# Patient Record
Sex: FEMALE | Race: WHITE | Employment: FULL TIME | URBAN - NONMETROPOLITAN AREA
[De-identification: names, ages, dates, MRNs, and addresses within clinical notes are randomized per-mention and may not be internally consistent; named-entity substitution may affect disease eponyms.]

---

## 2017-10-04 ENCOUNTER — APPOINTMENT (OUTPATIENT)
Dept: LABOR AND DELIVERY | Age: 27
End: 2017-10-04
Payer: COMMERCIAL

## 2017-10-04 ENCOUNTER — HOSPITAL ENCOUNTER (INPATIENT)
Age: 27
LOS: 2 days | Discharge: HOME | End: 2017-10-08
Attending: OBSTETRICS & GYNECOLOGY | Admitting: OBSTETRICS & GYNECOLOGY
Payer: COMMERCIAL

## 2017-10-04 LAB
ABO: NORMAL
ANISOCYTOSIS: ABNORMAL
ANTIBODY SCREEN: NORMAL
BASOPHILS # BLD: 0.2 %
BASOPHILS ABSOLUTE: 0 THOU/MM3 (ref 0–0.1)
EOSINOPHIL # BLD: 0.8 %
EOSINOPHILS ABSOLUTE: 0.1 THOU/MM3 (ref 0–0.4)
HCT VFR BLD CALC: 34.8 % (ref 37–47)
HEMOGLOBIN: 11.8 GM/DL (ref 12–16)
LYMPHOCYTES # BLD: 20.5 %
LYMPHOCYTES ABSOLUTE: 2 THOU/MM3 (ref 1–4.8)
MCH RBC QN AUTO: 30.3 PG (ref 27–31)
MCHC RBC AUTO-ENTMCNC: 33.9 GM/DL (ref 33–37)
MCV RBC AUTO: 89.2 FL (ref 81–99)
MONOCYTES # BLD: 5.3 %
MONOCYTES ABSOLUTE: 0.5 THOU/MM3 (ref 0.4–1.3)
NUCLEATED RED BLOOD CELLS: 0 /100 WBC
PDW BLD-RTO: 15.8 % (ref 11.5–14.5)
PLATELET # BLD: 175 THOU/MM3 (ref 130–400)
PMV BLD AUTO: 8.2 MCM (ref 7.4–10.4)
RBC # BLD: 3.89 MILL/MM3 (ref 4.2–5.4)
RBC # BLD: NORMAL 10*6/UL
RH FACTOR: NORMAL
SEG NEUTROPHILS: 73.2 %
SEGMENTED NEUTROPHILS ABSOLUTE COUNT: 7.2 THOU/MM3 (ref 1.8–7.7)
WBC # BLD: 9.9 THOU/MM3 (ref 4.8–10.8)

## 2017-10-04 PROCEDURE — 1220000001 HC SEMI PRIVATE L&D R&B

## 2017-10-04 PROCEDURE — 86592 SYPHILIS TEST NON-TREP QUAL: CPT

## 2017-10-04 PROCEDURE — 6370000000 HC RX 637 (ALT 250 FOR IP): Performed by: OBSTETRICS & GYNECOLOGY

## 2017-10-04 PROCEDURE — 86900 BLOOD TYPING SEROLOGIC ABO: CPT

## 2017-10-04 PROCEDURE — A6258 TRANSPARENT FILM >16<=48 IN: HCPCS

## 2017-10-04 PROCEDURE — 86901 BLOOD TYPING SEROLOGIC RH(D): CPT

## 2017-10-04 PROCEDURE — 86850 RBC ANTIBODY SCREEN: CPT

## 2017-10-04 PROCEDURE — 36415 COLL VENOUS BLD VENIPUNCTURE: CPT

## 2017-10-04 PROCEDURE — 6360000002 HC RX W HCPCS

## 2017-10-04 PROCEDURE — 85025 COMPLETE CBC W/AUTO DIFF WBC: CPT

## 2017-10-04 PROCEDURE — C1758 CATHETER, URETERAL: HCPCS

## 2017-10-04 PROCEDURE — 2580000003 HC RX 258: Performed by: OBSTETRICS & GYNECOLOGY

## 2017-10-04 RX ORDER — IBUPROFEN 800 MG/1
800 TABLET ORAL EVERY 8 HOURS PRN
Status: DISCONTINUED | OUTPATIENT
Start: 2017-10-04 | End: 2017-10-06

## 2017-10-04 RX ORDER — CARBOPROST TROMETHAMINE 250 UG/ML
250 INJECTION, SOLUTION INTRAMUSCULAR PRN
Status: CANCELLED | OUTPATIENT
Start: 2017-10-04

## 2017-10-04 RX ORDER — BUTORPHANOL TARTRATE 1 MG/ML
1 INJECTION, SOLUTION INTRAMUSCULAR; INTRAVENOUS
Status: DISCONTINUED | OUTPATIENT
Start: 2017-10-04 | End: 2017-10-06 | Stop reason: HOSPADM

## 2017-10-04 RX ORDER — METHYLERGONOVINE MALEATE 0.2 MG/ML
200 INJECTION INTRAVENOUS PRN
Status: DISCONTINUED | OUTPATIENT
Start: 2017-10-04 | End: 2017-10-06

## 2017-10-04 RX ORDER — MORPHINE SULFATE 2 MG/ML
4 INJECTION, SOLUTION INTRAMUSCULAR; INTRAVENOUS
Status: DISCONTINUED | OUTPATIENT
Start: 2017-10-04 | End: 2017-10-06

## 2017-10-04 RX ORDER — ONDANSETRON 2 MG/ML
8 INJECTION INTRAMUSCULAR; INTRAVENOUS EVERY 6 HOURS PRN
Status: DISCONTINUED | OUTPATIENT
Start: 2017-10-04 | End: 2017-10-06

## 2017-10-04 RX ORDER — LIDOCAINE HYDROCHLORIDE 10 MG/ML
30 INJECTION, SOLUTION EPIDURAL; INFILTRATION; INTRACAUDAL; PERINEURAL PRN
Status: DISCONTINUED | OUTPATIENT
Start: 2017-10-04 | End: 2017-10-06

## 2017-10-04 RX ORDER — SODIUM CHLORIDE 0.9 % (FLUSH) 0.9 %
10 SYRINGE (ML) INJECTION EVERY 12 HOURS SCHEDULED
Status: DISCONTINUED | OUTPATIENT
Start: 2017-10-04 | End: 2017-10-06 | Stop reason: HOSPADM

## 2017-10-04 RX ORDER — ACETAMINOPHEN 325 MG/1
650 TABLET ORAL EVERY 4 HOURS PRN
Status: DISCONTINUED | OUTPATIENT
Start: 2017-10-04 | End: 2017-10-06 | Stop reason: HOSPADM

## 2017-10-04 RX ORDER — SODIUM CHLORIDE 0.9 % (FLUSH) 0.9 %
10 SYRINGE (ML) INJECTION PRN
Status: DISCONTINUED | OUTPATIENT
Start: 2017-10-04 | End: 2017-10-06 | Stop reason: HOSPADM

## 2017-10-04 RX ORDER — SODIUM CHLORIDE, SODIUM LACTATE, POTASSIUM CHLORIDE, CALCIUM CHLORIDE 600; 310; 30; 20 MG/100ML; MG/100ML; MG/100ML; MG/100ML
INJECTION, SOLUTION INTRAVENOUS CONTINUOUS
Status: DISCONTINUED | OUTPATIENT
Start: 2017-10-04 | End: 2017-10-06

## 2017-10-04 RX ORDER — DIPHENHYDRAMINE HYDROCHLORIDE 50 MG/ML
25 INJECTION INTRAMUSCULAR; INTRAVENOUS EVERY 4 HOURS PRN
Status: DISCONTINUED | OUTPATIENT
Start: 2017-10-04 | End: 2017-10-06 | Stop reason: HOSPADM

## 2017-10-04 RX ORDER — MORPHINE SULFATE 2 MG/ML
2 INJECTION, SOLUTION INTRAMUSCULAR; INTRAVENOUS
Status: DISCONTINUED | OUTPATIENT
Start: 2017-10-04 | End: 2017-10-06

## 2017-10-04 RX ORDER — TERBUTALINE SULFATE 1 MG/ML
0.25 INJECTION, SOLUTION SUBCUTANEOUS ONCE
Status: DISCONTINUED | OUTPATIENT
Start: 2017-10-04 | End: 2017-10-06 | Stop reason: HOSPADM

## 2017-10-04 RX ADMIN — SODIUM CHLORIDE, POTASSIUM CHLORIDE, SODIUM LACTATE AND CALCIUM CHLORIDE: 600; 310; 30; 20 INJECTION, SOLUTION INTRAVENOUS at 20:40

## 2017-10-04 RX ADMIN — Medication 25 MCG: at 23:37

## 2017-10-05 ENCOUNTER — ANESTHESIA EVENT (OUTPATIENT)
Dept: LABOR AND DELIVERY | Age: 27
End: 2017-10-05
Payer: COMMERCIAL

## 2017-10-05 ENCOUNTER — ANESTHESIA (OUTPATIENT)
Dept: LABOR AND DELIVERY | Age: 27
End: 2017-10-05
Payer: COMMERCIAL

## 2017-10-05 LAB — RPR: NONREACTIVE

## 2017-10-05 PROCEDURE — 2580000003 HC RX 258: Performed by: OBSTETRICS & GYNECOLOGY

## 2017-10-05 PROCEDURE — 3700000025 ANESTHESIA EPIDURAL BLOCK: Performed by: ANESTHESIOLOGY

## 2017-10-05 PROCEDURE — 6370000000 HC RX 637 (ALT 250 FOR IP): Performed by: OBSTETRICS & GYNECOLOGY

## 2017-10-05 PROCEDURE — 6360000002 HC RX W HCPCS: Performed by: OBSTETRICS & GYNECOLOGY

## 2017-10-05 PROCEDURE — 1220000001 HC SEMI PRIVATE L&D R&B

## 2017-10-05 PROCEDURE — A6258 TRANSPARENT FILM >16<=48 IN: HCPCS

## 2017-10-05 PROCEDURE — 6360000002 HC RX W HCPCS: Performed by: NURSE ANESTHETIST, CERTIFIED REGISTERED

## 2017-10-05 PROCEDURE — 2500000003 HC RX 250 WO HCPCS: Performed by: ANESTHESIOLOGY

## 2017-10-05 RX ORDER — ROPIVACAINE HYDROCHLORIDE 2 MG/ML
INJECTION, SOLUTION EPIDURAL; INFILTRATION; PERINEURAL PRN
Status: DISCONTINUED | OUTPATIENT
Start: 2017-10-05 | End: 2017-10-06 | Stop reason: SDUPTHER

## 2017-10-05 RX ORDER — NALOXONE HYDROCHLORIDE 0.4 MG/ML
0.4 INJECTION, SOLUTION INTRAMUSCULAR; INTRAVENOUS; SUBCUTANEOUS PRN
Status: DISCONTINUED | OUTPATIENT
Start: 2017-10-05 | End: 2017-10-06 | Stop reason: HOSPADM

## 2017-10-05 RX ORDER — ROPIVACAINE HYDROCHLORIDE 5 MG/ML
INJECTION, SOLUTION EPIDURAL; INFILTRATION; PERINEURAL
Status: DISCONTINUED
Start: 2017-10-05 | End: 2017-10-05 | Stop reason: WASHOUT

## 2017-10-05 RX ORDER — ONDANSETRON 2 MG/ML
4 INJECTION INTRAMUSCULAR; INTRAVENOUS EVERY 6 HOURS PRN
Status: DISCONTINUED | OUTPATIENT
Start: 2017-10-05 | End: 2017-10-06

## 2017-10-05 RX ORDER — ROPIVACAINE HYDROCHLORIDE 2 MG/ML
INJECTION, SOLUTION EPIDURAL; INFILTRATION; PERINEURAL
Status: DISCONTINUED
Start: 2017-10-05 | End: 2017-10-06

## 2017-10-05 RX ADMIN — SODIUM CHLORIDE, POTASSIUM CHLORIDE, SODIUM LACTATE AND CALCIUM CHLORIDE: 600; 310; 30; 20 INJECTION, SOLUTION INTRAVENOUS at 03:37

## 2017-10-05 RX ADMIN — Medication 25 MCG: at 06:10

## 2017-10-05 RX ADMIN — BUTORPHANOL TARTRATE 1 MG: 1 INJECTION, SOLUTION INTRAMUSCULAR; INTRAVENOUS at 11:11

## 2017-10-05 RX ADMIN — Medication 15 ML: at 14:16

## 2017-10-05 RX ADMIN — ROPIVACAINE HYDROCHLORIDE 16 MG: 2 INJECTION, SOLUTION EPIDURAL; INFILTRATION at 14:15

## 2017-10-05 RX ADMIN — Medication 1 MILLI-UNITS/MIN: at 10:19

## 2017-10-05 RX ADMIN — SODIUM CHLORIDE, POTASSIUM CHLORIDE, SODIUM LACTATE AND CALCIUM CHLORIDE: 600; 310; 30; 20 INJECTION, SOLUTION INTRAVENOUS at 11:12

## 2017-10-05 RX ADMIN — ACETAMINOPHEN 650 MG: 325 TABLET ORAL at 09:29

## 2017-10-05 RX ADMIN — SODIUM CHLORIDE, POTASSIUM CHLORIDE, SODIUM LACTATE AND CALCIUM CHLORIDE: 600; 310; 30; 20 INJECTION, SOLUTION INTRAVENOUS at 14:33

## 2017-10-05 ASSESSMENT — PAIN SCALES - GENERAL
PAINLEVEL_OUTOF10: 5
PAINLEVEL_OUTOF10: 3

## 2017-10-05 NOTE — FLOWSHEET NOTE
Dr Lamar Hernandez at bedside, fetal monitor strip viewed. Discussing POC with patient and spouse. All questions and concerns addressed. Will continue with POC at this time.

## 2017-10-05 NOTE — ANESTHESIA PROCEDURE NOTES
Epidural Block    Patient location during procedure: OB  Start time: 10/5/2017 1:56 PM  End time: 10/5/2017 2:15 PM  Reason for block: labor epidural  Staffing  Anesthesiologist: Patricia Rodriguez  Resident/CRNA: Quentin Kimble  Performed by: resident/CRNA   Preanesthetic Checklist  Completed: patient identified, site marked, surgical consent, pre-op evaluation, timeout performed, IV checked, risks and benefits discussed, monitors and equipment checked, anesthesia consent given, oxygen available and patient being monitored  Epidural  Patient position: sitting  Prep: ChloraPrep and site prepped and draped  Patient monitoring: continuous pulse ox and frequent blood pressure checks  Approach: midline  Location: lumbar (1-5)  Injection technique: ISABELLA saline  Provider prep: mask and sterile gloves  Needle  Needle type: Tuohy   Needle gauge: 18 G  Needle length: 3.5 in  Needle insertion depth: 5.5 cm  Catheter type: stylet  Catheter size: 20g.   Catheter at skin depth: 12 cm  Test dose: negative  Kit: perifix  Lot number: [id number]  Expiration date: 11/30/2018  Assessment  Hemodynamics: stable

## 2017-10-05 NOTE — H&P
Sola Hairston is a 24 y.o. female patient. No diagnosis found. Past Medical History:   Diagnosis Date    Anxiety disorder     Depression      OB History      Para Term  AB Living    1         SAB TAB Ectopic Molar Multiple Live Births                 41w0d  Estimated Date of Delivery: 17  No Known Allergies  Active Problems:    * No active hospital problems. *    Blood pressure 101/62, pulse 83, temperature 98.1 °F (36.7 °C), resp. rate 18, height 5' 2\" (1.575 m), weight 181 lb (82.1 kg), SpO2 98 %. Maternal Medical History:   Reason for admission: 41 weeks for IOL    Contractions: Onset was 6-12 hours ago. Frequency: irregular. Perceived severity is moderate. Fetal activity: Perceived fetal activity is normal.    Last perceived fetal movement was within the past hour. Prenatal complications: no prenatal complications  Prenatal Complications - Diabetes: none. Maternal Exam:   Uterine Assessment: Contraction strength is moderate. Contraction frequency is regular. Abdomen: Patient reports no abdominal tenderness. Fetal presentation: vertex    Introitus: Normal vulva. Normal vagina. Pelvis: questionable for delivery. Cervix: Cervix evaluated by digital exam.    50/-2 AROM meconium FSE placed    Fetal Exam  Fetal Monitor Review: Mode: fetal scalp electrode. Baseline rate: 120. Variability: moderate (6-25 bpm). Fetal State Assessment: Category I - tracings are normal.          Assessment:  Early latent labor. Membrane status: SROM. Fetal well-being: normal.   41 weeks gestation    Plan:  Admit to L&D  Epidural in place  Vaginal delivery if good progression of labor.  section if arrest of dilation.       Miladys Romero,   10/5/2017

## 2017-10-05 NOTE — FLOWSHEET NOTE
Dr Ciro Kim paged; returned page. Update: subtle late decels with just about every ctx; on side where ctx are not picking up. Orders for an IUPC and amnioinfusion. To continue with plan of care.

## 2017-10-05 NOTE — FLOWSHEET NOTE
Called Dr Alexa Worthy, updated FHTs reactive, contractions every 3-6 minutes, mild. Patient been sleeping through them. Discussed VE fingertip/ thick and soft. First cytotec given at 2337 and unable to give second dose due to contraction pattern per policy. Orders received.

## 2017-10-05 NOTE — FLOWSHEET NOTE
Dr Nicci Barth paged; returned page. Given update on pt status, ie, aimee q 2 but mild, vag exam, 1 and tight, 60, -2, vertex, intact. Orders rec'd to start pitocin and continue with plan of care.

## 2017-10-05 NOTE — FLOWSHEET NOTE
Dr. Malcolm Martínez updated on SVE 3/80/-2, IUPC fell out, patient currently in hands and knees. Also made aware that RN will replace IUPC when patient out of hands and knees. States to continue with current plan of care.

## 2017-10-05 NOTE — FLOWSHEET NOTE
Pt resting with eyes closed. Report given to sara fink rn. She wanted to have pt to get into hands and knees; pt burst into tears and states she just wants to wait for dr Chavez Fails. Reassurances given as far as baby is concerned. Told her when dr Chavez Fails is done in the office she will be over.

## 2017-10-05 NOTE — FLOWSHEET NOTE
Called Dr Malcolm Martínez, updated FHTs reactive. Contraction pattern discussed. Second dose of cytotec given at 0610. VE unchanged, intact. Negative GBS. Will continue with POC.

## 2017-10-05 NOTE — FLOWSHEET NOTE
Pt is awake and alert. Discussed pain; contractions 2-3 but also had a headache \"from not having any caffeine this morning\"; that pain is a good 3 per pt. Up to b/r to void and do personal hygiene. Will call out when finished.

## 2017-10-06 VITALS — TEMPERATURE: 98.6 F | DIASTOLIC BLOOD PRESSURE: 82 MMHG | OXYGEN SATURATION: 99 % | SYSTOLIC BLOOD PRESSURE: 148 MMHG

## 2017-10-06 PROCEDURE — 6370000000 HC RX 637 (ALT 250 FOR IP): Performed by: OBSTETRICS & GYNECOLOGY

## 2017-10-06 PROCEDURE — 2580000003 HC RX 258: Performed by: OBSTETRICS & GYNECOLOGY

## 2017-10-06 PROCEDURE — 2500000003 HC RX 250 WO HCPCS: Performed by: OBSTETRICS & GYNECOLOGY

## 2017-10-06 PROCEDURE — 3700000000 HC ANESTHESIA ATTENDED CARE: Performed by: OBSTETRICS & GYNECOLOGY

## 2017-10-06 PROCEDURE — 2580000003 HC RX 258: Performed by: ANESTHESIOLOGY

## 2017-10-06 PROCEDURE — 6360000002 HC RX W HCPCS: Performed by: ANESTHESIOLOGY

## 2017-10-06 PROCEDURE — 3700000001 HC ADD 15 MINUTES (ANESTHESIA): Performed by: OBSTETRICS & GYNECOLOGY

## 2017-10-06 PROCEDURE — 7100000001 HC PACU RECOVERY - ADDTL 15 MIN: Performed by: OBSTETRICS & GYNECOLOGY

## 2017-10-06 PROCEDURE — 6360000002 HC RX W HCPCS: Performed by: OBSTETRICS & GYNECOLOGY

## 2017-10-06 PROCEDURE — 3609079900 HC CESAREAN SECTION: Performed by: OBSTETRICS & GYNECOLOGY

## 2017-10-06 PROCEDURE — 10907ZC DRAINAGE OF AMNIOTIC FLUID, THERAPEUTIC FROM PRODUCTS OF CONCEPTION, VIA NATURAL OR ARTIFICIAL OPENING: ICD-10-PCS | Performed by: OBSTETRICS & GYNECOLOGY

## 2017-10-06 PROCEDURE — 1220000000 HC SEMI PRIVATE OB R&B

## 2017-10-06 PROCEDURE — 6360000002 HC RX W HCPCS

## 2017-10-06 PROCEDURE — 2500000003 HC RX 250 WO HCPCS

## 2017-10-06 PROCEDURE — 2500000003 HC RX 250 WO HCPCS: Performed by: ANESTHESIOLOGY

## 2017-10-06 PROCEDURE — 7100000000 HC PACU RECOVERY - FIRST 15 MIN: Performed by: OBSTETRICS & GYNECOLOGY

## 2017-10-06 PROCEDURE — S0028 INJECTION, FAMOTIDINE, 20 MG: HCPCS | Performed by: OBSTETRICS & GYNECOLOGY

## 2017-10-06 RX ORDER — KETOROLAC TROMETHAMINE 30 MG/ML
30 INJECTION, SOLUTION INTRAMUSCULAR; INTRAVENOUS EVERY 6 HOURS
Status: DISPENSED | OUTPATIENT
Start: 2017-10-06 | End: 2017-10-07

## 2017-10-06 RX ORDER — DOCUSATE SODIUM 100 MG/1
100 CAPSULE, LIQUID FILLED ORAL 2 TIMES DAILY
Status: DISCONTINUED | OUTPATIENT
Start: 2017-10-06 | End: 2017-10-08 | Stop reason: HOSPADM

## 2017-10-06 RX ORDER — ONDANSETRON 2 MG/ML
INJECTION INTRAMUSCULAR; INTRAVENOUS
Status: COMPLETED
Start: 2017-10-06 | End: 2017-10-06

## 2017-10-06 RX ORDER — MORPHINE SULFATE 2 MG/ML
2 INJECTION, SOLUTION INTRAMUSCULAR; INTRAVENOUS
Status: DISCONTINUED | OUTPATIENT
Start: 2017-10-06 | End: 2017-10-08 | Stop reason: HOSPADM

## 2017-10-06 RX ORDER — ONDANSETRON 2 MG/ML
4 INJECTION INTRAMUSCULAR; INTRAVENOUS EVERY 6 HOURS PRN
Status: DISCONTINUED | OUTPATIENT
Start: 2017-10-06 | End: 2017-10-06

## 2017-10-06 RX ORDER — LANOLIN 100 %
OINTMENT (GRAM) TOPICAL
Status: DISCONTINUED | OUTPATIENT
Start: 2017-10-06 | End: 2017-10-08 | Stop reason: HOSPADM

## 2017-10-06 RX ORDER — DIPHENHYDRAMINE HYDROCHLORIDE 50 MG/ML
25 INJECTION INTRAMUSCULAR; INTRAVENOUS EVERY 6 HOURS PRN
Status: DISCONTINUED | OUTPATIENT
Start: 2017-10-06 | End: 2017-10-08 | Stop reason: HOSPADM

## 2017-10-06 RX ORDER — SODIUM CHLORIDE, SODIUM LACTATE, POTASSIUM CHLORIDE, CALCIUM CHLORIDE 600; 310; 30; 20 MG/100ML; MG/100ML; MG/100ML; MG/100ML
INJECTION, SOLUTION INTRAVENOUS CONTINUOUS
Status: DISCONTINUED | OUTPATIENT
Start: 2017-10-06 | End: 2017-10-08 | Stop reason: HOSPADM

## 2017-10-06 RX ORDER — ACETAMINOPHEN 325 MG/1
650 TABLET ORAL EVERY 4 HOURS PRN
Status: DISCONTINUED | OUTPATIENT
Start: 2017-10-06 | End: 2017-10-08 | Stop reason: HOSPADM

## 2017-10-06 RX ORDER — METHYLERGONOVINE MALEATE 0.2 MG/ML
200 INJECTION INTRAVENOUS PRN
Status: DISCONTINUED | OUTPATIENT
Start: 2017-10-06 | End: 2017-10-08 | Stop reason: HOSPADM

## 2017-10-06 RX ORDER — KETOROLAC TROMETHAMINE 30 MG/ML
INJECTION, SOLUTION INTRAMUSCULAR; INTRAVENOUS
Status: COMPLETED
Start: 2017-10-06 | End: 2017-10-06

## 2017-10-06 RX ORDER — MORPHINE SULFATE 2 MG/ML
4 INJECTION, SOLUTION INTRAMUSCULAR; INTRAVENOUS
Status: DISCONTINUED | OUTPATIENT
Start: 2017-10-06 | End: 2017-10-08 | Stop reason: HOSPADM

## 2017-10-06 RX ORDER — PRENATAL WITH FERROUS FUM AND FOLIC ACID 3080; 920; 120; 400; 22; 1.84; 3; 20; 10; 1; 12; 200; 27; 25; 2 [IU]/1; [IU]/1; MG/1; [IU]/1; MG/1; MG/1; MG/1; MG/1; MG/1; MG/1; UG/1; MG/1; MG/1; MG/1; MG/1
1 TABLET ORAL DAILY
Status: DISCONTINUED | OUTPATIENT
Start: 2017-10-06 | End: 2017-10-08 | Stop reason: HOSPADM

## 2017-10-06 RX ORDER — LIDOCAINE HYDROCHLORIDE 20 MG/ML
INJECTION, SOLUTION EPIDURAL; INFILTRATION; INTRACAUDAL; PERINEURAL PRN
Status: DISCONTINUED | OUTPATIENT
Start: 2017-10-06 | End: 2017-10-06 | Stop reason: SDUPTHER

## 2017-10-06 RX ORDER — OXYCODONE HYDROCHLORIDE AND ACETAMINOPHEN 5; 325 MG/1; MG/1
1 TABLET ORAL EVERY 4 HOURS PRN
Status: DISCONTINUED | OUTPATIENT
Start: 2017-10-06 | End: 2017-10-08 | Stop reason: HOSPADM

## 2017-10-06 RX ORDER — ONDANSETRON 2 MG/ML
8 INJECTION INTRAMUSCULAR; INTRAVENOUS ONCE
Status: COMPLETED | OUTPATIENT
Start: 2017-10-06 | End: 2017-10-06

## 2017-10-06 RX ORDER — MISOPROSTOL 200 UG/1
800 TABLET ORAL PRN
Status: DISCONTINUED | OUTPATIENT
Start: 2017-10-06 | End: 2017-10-08 | Stop reason: HOSPADM

## 2017-10-06 RX ORDER — SIMETHICONE 80 MG
80 TABLET,CHEWABLE ORAL EVERY 6 HOURS PRN
Status: DISCONTINUED | OUTPATIENT
Start: 2017-10-06 | End: 2017-10-08 | Stop reason: HOSPADM

## 2017-10-06 RX ORDER — FERROUS SULFATE 325(65) MG
325 TABLET ORAL
Status: DISCONTINUED | OUTPATIENT
Start: 2017-10-06 | End: 2017-10-08 | Stop reason: HOSPADM

## 2017-10-06 RX ORDER — BISACODYL 10 MG
10 SUPPOSITORY, RECTAL RECTAL DAILY PRN
Status: DISCONTINUED | OUTPATIENT
Start: 2017-10-06 | End: 2017-10-08 | Stop reason: HOSPADM

## 2017-10-06 RX ORDER — SODIUM CHLORIDE 0.9 % (FLUSH) 0.9 %
10 SYRINGE (ML) INJECTION EVERY 12 HOURS SCHEDULED
Status: DISCONTINUED | OUTPATIENT
Start: 2017-10-06 | End: 2017-10-08 | Stop reason: HOSPADM

## 2017-10-06 RX ORDER — OXYCODONE HYDROCHLORIDE AND ACETAMINOPHEN 5; 325 MG/1; MG/1
2 TABLET ORAL EVERY 4 HOURS PRN
Status: DISCONTINUED | OUTPATIENT
Start: 2017-10-06 | End: 2017-10-08 | Stop reason: HOSPADM

## 2017-10-06 RX ORDER — SODIUM CHLORIDE 0.9 % (FLUSH) 0.9 %
10 SYRINGE (ML) INJECTION PRN
Status: DISCONTINUED | OUTPATIENT
Start: 2017-10-06 | End: 2017-10-08 | Stop reason: HOSPADM

## 2017-10-06 RX ORDER — ONDANSETRON 4 MG/1
8 TABLET, FILM COATED ORAL EVERY 8 HOURS PRN
Status: DISCONTINUED | OUTPATIENT
Start: 2017-10-06 | End: 2017-10-08 | Stop reason: HOSPADM

## 2017-10-06 RX ORDER — SODIUM CHLORIDE, SODIUM LACTATE, POTASSIUM CHLORIDE, CALCIUM CHLORIDE 600; 310; 30; 20 MG/100ML; MG/100ML; MG/100ML; MG/100ML
INJECTION, SOLUTION INTRAVENOUS CONTINUOUS PRN
Status: DISCONTINUED | OUTPATIENT
Start: 2017-10-06 | End: 2017-10-06 | Stop reason: SDUPTHER

## 2017-10-06 RX ORDER — IBUPROFEN 800 MG/1
800 TABLET ORAL EVERY 8 HOURS
Status: DISCONTINUED | OUTPATIENT
Start: 2017-10-07 | End: 2017-10-08 | Stop reason: HOSPADM

## 2017-10-06 RX ORDER — ONDANSETRON 2 MG/ML
4 INJECTION INTRAMUSCULAR; INTRAVENOUS EVERY 6 HOURS PRN
Status: DISCONTINUED | OUTPATIENT
Start: 2017-10-06 | End: 2017-10-08 | Stop reason: HOSPADM

## 2017-10-06 RX ORDER — KETOROLAC TROMETHAMINE 30 MG/ML
30 INJECTION, SOLUTION INTRAMUSCULAR; INTRAVENOUS ONCE
Status: COMPLETED | OUTPATIENT
Start: 2017-10-06 | End: 2017-10-06

## 2017-10-06 RX ORDER — OXYTOCIN 10 [USP'U]/ML
INJECTION, SOLUTION INTRAMUSCULAR; INTRAVENOUS PRN
Status: DISCONTINUED | OUTPATIENT
Start: 2017-10-06 | End: 2017-10-06 | Stop reason: SDUPTHER

## 2017-10-06 RX ORDER — CARBOPROST TROMETHAMINE 250 UG/ML
250 INJECTION, SOLUTION INTRAMUSCULAR PRN
Status: DISCONTINUED | OUTPATIENT
Start: 2017-10-06 | End: 2017-10-08 | Stop reason: HOSPADM

## 2017-10-06 RX ADMIN — ONDANSETRON 8 MG: 2 INJECTION INTRAMUSCULAR; INTRAVENOUS at 04:26

## 2017-10-06 RX ADMIN — LIDOCAINE HYDROCHLORIDE 10 ML: 20 INJECTION, SOLUTION EPIDURAL; INFILTRATION; INTRACAUDAL; PERINEURAL at 02:53

## 2017-10-06 RX ADMIN — OXYCODONE HYDROCHLORIDE AND ACETAMINOPHEN 2 TABLET: 5; 325 TABLET ORAL at 22:00

## 2017-10-06 RX ADMIN — OXYCODONE HYDROCHLORIDE AND ACETAMINOPHEN 2 TABLET: 5; 325 TABLET ORAL at 09:16

## 2017-10-06 RX ADMIN — DOCUSATE SODIUM 100 MG: 100 CAPSULE, LIQUID FILLED ORAL at 20:53

## 2017-10-06 RX ADMIN — KETOROLAC TROMETHAMINE 30 MG: 30 INJECTION, SOLUTION INTRAMUSCULAR at 16:18

## 2017-10-06 RX ADMIN — OXYTOCIN 40 UNITS: 10 INJECTION, SOLUTION INTRAMUSCULAR; INTRAVENOUS at 03:09

## 2017-10-06 RX ADMIN — OXYCODONE HYDROCHLORIDE AND ACETAMINOPHEN 2 TABLET: 5; 325 TABLET ORAL at 17:52

## 2017-10-06 RX ADMIN — SODIUM CHLORIDE, POTASSIUM CHLORIDE, SODIUM LACTATE AND CALCIUM CHLORIDE: 600; 310; 30; 20 INJECTION, SOLUTION INTRAVENOUS at 00:28

## 2017-10-06 RX ADMIN — SODIUM CHLORIDE, POTASSIUM CHLORIDE, SODIUM LACTATE AND CALCIUM CHLORIDE: 600; 310; 30; 20 INJECTION, SOLUTION INTRAVENOUS at 05:57

## 2017-10-06 RX ADMIN — KETOROLAC TROMETHAMINE 30 MG: 30 INJECTION, SOLUTION INTRAMUSCULAR; INTRAVENOUS at 04:11

## 2017-10-06 RX ADMIN — OXYCODONE HYDROCHLORIDE AND ACETAMINOPHEN 2 TABLET: 5; 325 TABLET ORAL at 13:26

## 2017-10-06 RX ADMIN — KETOROLAC TROMETHAMINE 30 MG: 30 INJECTION, SOLUTION INTRAMUSCULAR at 04:16

## 2017-10-06 RX ADMIN — SODIUM CHLORIDE, POTASSIUM CHLORIDE, SODIUM LACTATE AND CALCIUM CHLORIDE: 600; 310; 30; 20 INJECTION, SOLUTION INTRAVENOUS at 02:32

## 2017-10-06 RX ADMIN — DOCUSATE SODIUM 100 MG: 100 CAPSULE, LIQUID FILLED ORAL at 09:16

## 2017-10-06 RX ADMIN — LIDOCAINE HYDROCHLORIDE 4 ML: 20 INJECTION, SOLUTION EPIDURAL; INFILTRATION; INTRACAUDAL; PERINEURAL at 02:59

## 2017-10-06 RX ADMIN — SODIUM CHLORIDE, POTASSIUM CHLORIDE, SODIUM LACTATE AND CALCIUM CHLORIDE: 600; 310; 30; 20 INJECTION, SOLUTION INTRAVENOUS at 13:27

## 2017-10-06 RX ADMIN — FAMOTIDINE 20 MG: 10 INJECTION, SOLUTION INTRAVENOUS at 02:31

## 2017-10-06 RX ADMIN — KETOROLAC TROMETHAMINE 30 MG: 30 INJECTION, SOLUTION INTRAMUSCULAR at 10:20

## 2017-10-06 RX ADMIN — OXYCODONE HYDROCHLORIDE AND ACETAMINOPHEN 2 TABLET: 5; 325 TABLET ORAL at 05:04

## 2017-10-06 RX ADMIN — KETOROLAC TROMETHAMINE 30 MG: 30 INJECTION, SOLUTION INTRAMUSCULAR at 04:11

## 2017-10-06 RX ADMIN — SODIUM CHLORIDE, POTASSIUM CHLORIDE, SODIUM LACTATE AND CALCIUM CHLORIDE: 600; 310; 30; 20 INJECTION, SOLUTION INTRAVENOUS at 02:52

## 2017-10-06 RX ADMIN — SODIUM CITRATE AND CITRIC ACID MONOHYDRATE 15 ML: 500; 334 SOLUTION ORAL at 02:29

## 2017-10-06 RX ADMIN — PRENATAL WITH FERROUS FUM AND FOLIC ACID 1 TABLET: 3080; 920; 120; 400; 22; 1.84; 3; 20; 10; 1; 12; 200; 27; 25; 2 TABLET ORAL at 09:16

## 2017-10-06 RX ADMIN — CEFAZOLIN SODIUM 2 G: 2 SOLUTION INTRAVENOUS at 02:35

## 2017-10-06 ASSESSMENT — PULMONARY FUNCTION TESTS
PIF_VALUE: 0

## 2017-10-06 ASSESSMENT — PAIN SCALES - GENERAL
PAINLEVEL_OUTOF10: 0
PAINLEVEL_OUTOF10: 1
PAINLEVEL_OUTOF10: 2
PAINLEVEL_OUTOF10: 7
PAINLEVEL_OUTOF10: 7
PAINLEVEL_OUTOF10: 3
PAINLEVEL_OUTOF10: 7
PAINLEVEL_OUTOF10: 3
PAINLEVEL_OUTOF10: 5
PAINLEVEL_OUTOF10: 7
PAINLEVEL_OUTOF10: 2

## 2017-10-06 NOTE — FLOWSHEET NOTE
Dr. Benson Lawson called unit, updated on SVE 3-4/80/-2 anterior. Also made aware that IUPC replaced and amnioinfusion not infusing. Notified of FHT with FSE, ctx pattern, Pitocin remains off, LR infusing, O2 on per face mask, repositioned to left side at this time was in hands & knees for approximately 1 hour. States to keep amnioinfusion off at this time, restart if needed, restart Pitocin if needed.

## 2017-10-06 NOTE — FLOWSHEET NOTE
Dr Arianna Alegria on unit. EFM strip reviewed and MD states it's ok to restart pitocin and titrate per order.

## 2017-10-06 NOTE — FLOWSHEET NOTE
Pt back from [facility]. Pt up and ambulated in room. Back to bed. Janet care given and pads changed, small amount of vaginal bleeding noted. Abdominal dressing remains dry and intact.

## 2017-10-06 NOTE — ANESTHESIA PRE PROCEDURE
Allergies    Problem List:  There is no problem list on file for this patient. Past Medical History:        Diagnosis Date    Anxiety disorder 2013    Depression 2013       Past Surgical History:  History reviewed. No pertinent surgical history. Social History:    Social History   Substance Use Topics    Smoking status: Never Smoker    Smokeless tobacco: Never Used    Alcohol use No                                Counseling given: Not Answered      Vital Signs (Current):   Vitals:    10/06/17 0524 10/06/17 0539 10/06/17 0554 10/06/17 0641   BP: 108/63 103/62 103/61 106/66   Pulse: 77 81 75 72   Resp:    18   Temp:    36.7 °C (98 °F)   TempSrc:    Oral   SpO2:       Weight:       Height:                                                  BP Readings from Last 3 Encounters:   10/06/17 106/66   10/06/17 (!) 148/82   10/02/17 (!) 109/6       NPO Status: Time of last liquid consumption: 1500                        Time of last solid consumption: 0950                        Date of last liquid consumption: 10/04/17                        Date of last solid food consumption: 10/04/17    BMI:   Wt Readings from Last 3 Encounters:   10/04/17 181 lb (82.1 kg)     Body mass index is 33.11 kg/(m^2). CBC:   Lab Results   Component Value Date    WBC 9.9 10/04/2017    RBC 3.89 10/04/2017    HGB 11.8 10/04/2017    HCT 34.8 10/04/2017    MCV 89.2 10/04/2017    RDW 15.8 10/04/2017     10/04/2017       CMP: No results found for: NA, K, CL, CO2, BUN, CREATININE, GFRAA, AGRATIO, LABGLOM, GLUCOSE, PROT, CALCIUM, BILITOT, ALKPHOS, AST, ALT    POC Tests: No results for input(s): POCGLU, POCNA, POCK, POCCL, POCBUN, POCHEMO, POCHCT in the last 72 hours.     Coags: No results found for: PROTIME, INR, APTT    HCG (If Applicable): No results found for: PREGTESTUR, PREGSERUM, HCG, HCGQUANT     ABGs: No results found for: PHART, PO2ART, OOK6QZK, QAH6YQB, BEART, [id number]     Type & Screen (If Applicable):  Lab Results Component Value Date    [address] 10/04/2017       Anesthesia Evaluation  Patient summary reviewed and Nursing notes reviewed  Airway: Mallampati: II  TM distance: >3 FB   Neck ROM: full  Mouth opening: > = 3 FB Dental: normal exam         Pulmonary:negative ROS and normal exam           Cardiovascular:negative ROS            Rhythm: regular  Rate: normal                 Neuro/Psych:   (+) psychiatric history:   GI/Hepatic/Renal: neg ROS          Endo/Other: negative ROS         Abdominal:                    Anesthesia Plan    ASA 2     epidural     Anesthetic plan and risks discussed with patient. Use of blood products discussed with whom consented to blood products. Plan discussed with attending.   Attending anesthesiologist reviewed and agrees with Pre Eval content          Sean Ng CRNA   10/6/2017

## 2017-10-06 NOTE — L&D DELIVERY NOTE
Baby passed quickly to the  team limp. Cord blood was obtained, the placenta manually extracted and delivered intact with a 3 vessel cord. The uterus was  cleared of all clots and debris and repaired with #0 vicryl in a running locked fashion. A second imbricating layer of 0 vicryl was used for uterine strength. Hemostasis was assured with Bovie cautery. The peritoneum was closed with a 0 vicryl, the fascia was inspected and found to be hemostatic and closed with 0 vicryl in a continuous fashion. The subcutaneous tissue was irrigated, made hemostatic with Bovie cautery. The skin was closed with 4 0 vicryl suture (s). Sponge, lap and needle counts were correct x 2. The patient tolerated the procedure well. The patient received antibiotics prior to skin incision. Delivery Summary:     Mother's Information     Labor Events     labor?:  No   Rupture date:  10/5/17 Rupture time:     Rupture type:  Artificial=AROM   Fluid color:  Meconium, Bloody Show   Fluid odor:  None               Mother Delivery Information    Episiotomy:  None   Lacerations:  None   Surgical or Additional Est. Blood Loss (mL):  600 (View Only):  Edit in Flowsheets   Combined Est. Blood Loss (mL):  600            Lynn, Baby Girl Estefanía Jorge [[id number]]     Events of Labor     labor?:  No    steroids?:  None   Cervical ripening date/time: 10/4/17 2337   Cervical ripening type:  Misoprostol   Antibiotics received during labor?:  No   Rupture date/time: 10/5/17 1415   Rupture type:  Artificial=AROM   Fluid color:  Meconium, Bloody Show   Meconium consistency:   Thick   Fluid odor:  None   Induction:  Misoprostol   Indications for induction:  Post-term Gestation   Augmentation:  Oxytocin, AROM   Indications for augmentation:  Ineffective Contraction Pattern   Labor complications:  Failure to Progress in First Stage         Print Group Title    Labor onset date/time: 10/5/17 1330   Dilation complete date/time: Start pushing:    Decision time (emergent ): 10/6/2017 0223      Anesthesia    Method:  Epidural         Assisted Delivery Details    Forceps attempted?:  No   Vacuum extractor attempted?:  Yes         Vacuum type:  mushroom cup   First attempt time vacuum applied:  308    First attempt time vacuum removed:  308       Document Additional Attempt       Document Additional Attempt       Number of pop offs:  0      Number of pulls:  1    Total vacuum application time:  5 seconds   Vacuum applied by:  DR Sarahi Guajardo    Failed?:  No            Shoulder Dystocia    Shoulder dystocia present?:  No            Add Second Maneuver      Add Third Maneuver      Add Fourth Maneuver      Add Fifth Maneuver      Add Sixth Maneuver      Add Seventh Maneuver      Add Eighth Maneuver      Add Ninth Maneuver         [place] Presentation    Presentation:  Vertex      [place] Information     Changing the 's delivery date/time could affect patient care.:     Delivery date/time:   10/6/17 0308   Delivery type:  , Low Transverse    Details:   Trial of labor?:  Yes    categorization:  Primary    priority:  Non-scheduled   Indications for :  Failure to Progress, Fetal Intolerance of Labor   Skin Incision Type:  Low Transverse            Delivery Providers    Delivering clinician:  Jose Patel   Other personnel:   Provider Role   Trini Vaca Nurse Anesthetist   Karo Aviles Delivery Nurse   Gemma Wild Registered Nurse   Eloisa HERNANDEZ Respiratory Therapist (Night)            Placenta    Date/time:  10/6/2017 0310   Removal:  Expressed   Appearance:  Intact   Disposition:  Refrigerator      Delivery Information    Episiotomy:  None   Perineal lacerations:  None    Vaginal laceration:  No    Cervical laceration:  No    Surgical or additional est. blood loss (mL):  600 (View Only):  Edit in Flowsheets   Combined est. blood loss (mL):  600      Other Procedures Procedures:  None                Phuong Stern DO  10/6/2017 3:54 AM

## 2017-10-06 NOTE — FLOWSHEET NOTE
7223- Urgent c/s called per Dr. Michelle Minor for FTP and NRFHT; OR notified  [phone]- SCN and resp therapy notified  [facility] supervisor notified  Gage Mackay notifed

## 2017-10-06 NOTE — FLOWSHEET NOTE
Dr. Heide Mahoney called and informed of patient concerns. States that she is going to see a patient on 5K and then will come talk to her.

## 2017-10-06 NOTE — FLOWSHEET NOTE
Pt back from [facility]. Ambulated to bathroom and pierson catheter removed balloon intact. Janet care given small amount of vaginal bleeding noted. Pt back to bed SCD's on. Pt tolerated well. Instructed to call out for help up to the bathroom times 2. Voices understanding.

## 2017-10-06 NOTE — FLOWSHEET NOTE
Patient concerned that her laboring is dangerous for her baby. She states that previous RN informed her that baby needs to come out via C/S so she is healthy. RN informed patient that if FHT become nonreassuring, she develops a temp or arrest of dilation she will continue to labor. RN will inform Dr. Ace Nobles of her concerns.

## 2017-10-06 NOTE — FLOWSHEET NOTE
Dr Patricia Arteaga updated on pt's status. FHT's are non-reassuring despite position changes, IV fluid bolus, discontinuing the pitocin, and oxygen via mask. VE repeated and unchanged. Pt states she's ready to be done and do a c/section. Order received for urgent c/s.  Will notify the team.

## 2017-10-06 NOTE — FLOWSHEET NOTE
Janet care done, clean pads and gown provided. Baby in nursery. Will take pt to SCN to see baby prior to transfer to mother/baby.

## 2017-10-07 LAB — HEMOGLOBIN: 9.6 GM/DL (ref 12–16)

## 2017-10-07 PROCEDURE — 1220000000 HC SEMI PRIVATE OB R&B

## 2017-10-07 PROCEDURE — 6370000000 HC RX 637 (ALT 250 FOR IP): Performed by: OBSTETRICS & GYNECOLOGY

## 2017-10-07 PROCEDURE — 36415 COLL VENOUS BLD VENIPUNCTURE: CPT

## 2017-10-07 PROCEDURE — 85018 HEMOGLOBIN: CPT

## 2017-10-07 RX ADMIN — ACETAMINOPHEN 650 MG: 325 TABLET ORAL at 23:40

## 2017-10-07 RX ADMIN — PRENATAL WITH FERROUS FUM AND FOLIC ACID 1 TABLET: 3080; 920; 120; 400; 22; 1.84; 3; 20; 10; 1; 12; 200; 27; 25; 2 TABLET ORAL at 07:34

## 2017-10-07 RX ADMIN — IBUPROFEN 800 MG: 800 TABLET, FILM COATED ORAL at 00:31

## 2017-10-07 RX ADMIN — OXYCODONE HYDROCHLORIDE AND ACETAMINOPHEN 2 TABLET: 5; 325 TABLET ORAL at 01:46

## 2017-10-07 RX ADMIN — OXYCODONE HYDROCHLORIDE AND ACETAMINOPHEN 1 TABLET: 5; 325 TABLET ORAL at 12:27

## 2017-10-07 RX ADMIN — OXYCODONE HYDROCHLORIDE AND ACETAMINOPHEN 1 TABLET: 5; 325 TABLET ORAL at 07:35

## 2017-10-07 RX ADMIN — IBUPROFEN 800 MG: 800 TABLET, FILM COATED ORAL at 16:41

## 2017-10-07 RX ADMIN — DOCUSATE SODIUM 100 MG: 100 CAPSULE, LIQUID FILLED ORAL at 07:34

## 2017-10-07 RX ADMIN — IBUPROFEN 800 MG: 800 TABLET, FILM COATED ORAL at 08:36

## 2017-10-07 ASSESSMENT — PAIN SCALES - GENERAL
PAINLEVEL_OUTOF10: 4
PAINLEVEL_OUTOF10: 7
PAINLEVEL_OUTOF10: 4
PAINLEVEL_OUTOF10: 3
PAINLEVEL_OUTOF10: 1
PAINLEVEL_OUTOF10: 4
PAINLEVEL_OUTOF10: 2
PAINLEVEL_OUTOF10: 4
PAINLEVEL_OUTOF10: 3
PAINLEVEL_OUTOF10: 3

## 2017-10-07 NOTE — PLAN OF CARE
Problem: Anxiety:  Goal: Level of anxiety will decrease  Level of anxiety will decrease   Outcome: Ongoing  Pt remains calm about the birthing experience,  and mother at bedside, supportive. All questions/concerns addressed by RN. Problem: Breathing Pattern - Ineffective:  Goal: Able to breathe comfortably  Able to breathe comfortably   Outcome: Ongoing  No signs of resp distress noted. Sp02 remains greater than 92% on room air. Respirations equal and unlabored. Problem: Fluid Volume - Imbalance:  Goal: Absence of intrapartum hemorrhage signs and symptoms  Absence of intrapartum hemorrhage signs and symptoms   Outcome: Ongoing  No vaginal bleeding noted, will continue to monitor. Problem: Infection - Intrapartum Infection:  Goal: Will show no infection signs and symptoms  Will show no infection signs and symptoms   Outcome: Ongoing  Vitals stable, pt remains afebrile. GBS negative. FHT's remain reassuring, will continue to monitor. Problem: Labor Process - Prolonged:  Goal: Uterine contractions within specified parameters  Uterine contractions within specified parameters   Outcome: Ongoing  Pt having regular contractions and pitocin started and titrated according to order. Problem: Pain - Acute:  Goal: Able to cope with pain  Able to cope with pain   Outcome: Ongoing  Pt comfortable with her epidural.    Problem: Tissue Perfusion - Uteroplacental, Altered:  Goal: Absence of abnormal fetal heart rate pattern  Absence of abnormal fetal heart rate pattern   Outcome: Ongoing  Fetal Heart Tones remain reassuring. Continuous EFM in place. Problem: Urinary Retention:  Goal: Urinary elimination within specified parameters  Urinary elimination within specified parameters   Outcome: Ongoing  Brooks catheter in place. Clear, yellow urine present.      Problem: Falls - Risk of:  Goal: Will remain free from falls  Will remain free from falls   Outcome: Ongoing  Pt. remains free from
Problem: Anxiety:  Goal: Level of anxiety will decrease  Level of anxiety will decrease   Outcome: Ongoing  Pt will continue to have low anxiety with support from family and staff    Problem: Breathing Pattern - Ineffective:  Goal: Able to breathe comfortably  Able to breathe comfortably   Outcome: Ongoing  Pt will continue to have easy and unlabored respirs    Problem: Fluid Volume - Imbalance:  Goal: Absence of intrapartum hemorrhage signs and symptoms  Absence of intrapartum hemorrhage signs and symptoms   Outcome: Ongoing  Pt will continue to be watched for s/s of any unusual bleeding    Problem: Infection - Intrapartum Infection:  Goal: Will show no infection signs and symptoms  Will show no infection signs and symptoms   Outcome: Ongoing  Pt will continue to remain afebrile    Problem: Labor Process - Prolonged:  Goal: Uterine contractions within specified parameters  Uterine contractions within specified parameters   Outcome: Ongoing  Pt will continue to have contractions that will dilate her cervix    Problem: Pain - Acute:  Goal: Able to cope with pain  Able to cope with pain   Outcome: Ongoing  Pt is planning an epidural for labor discomfort    Problem: Tissue Perfusion - Uteroplacental, Altered:  Goal: Absence of abnormal fetal heart rate pattern  Absence of abnormal fetal heart rate pattern   Outcome: Ongoing  FHT's will continue to have mod variability and spontaneous accels    Problem: Urinary Retention:  Goal: Urinary elimination within specified parameters  Urinary elimination within specified parameters   Outcome: Ongoing  Pt will continue to void qs    Problem: Falls - Risk of:  Goal: Will remain free from falls  Will remain free from falls   Outcome: Ongoing  Pt will continue to be free from falls    Problem: Discharge Planning:  Goal: Discharged to appropriate level of care  Discharged to appropriate level of care   Outcome: Ongoing  After labor, delivery and 2 hour recovery period, pt will be
Problem: Anxiety:  Goal: Level of anxiety will decrease  Level of anxiety will decrease  Outcome: Ongoing  Patient appears calm and cooperative, spouse at bedside. RN offering positive reassurance    Problem: Breathing Pattern - Ineffective:  Goal: Able to breathe comfortably  Able to breathe comfortably  Outcome: Ongoing  Easy and unlabored respirations, denies SOB    Problem: Fluid Volume - Imbalance:  Goal: Absence of intrapartum hemorrhage signs and symptoms  Absence of intrapartum hemorrhage signs and symptoms  Outcome: Ongoing  No vaginal bleeding noted, will continue to monitor. Problem: Infection - Intrapartum Infection:  Goal: Will show no infection signs and symptoms  Will show no infection signs and symptoms  Outcome: Ongoing  Vitals stable, remains afebrile. Problem: Labor Process - Prolonged:  Goal: Uterine contractions within specified parameters  Uterine contractions within specified parameters  Outcome: Ongoing  [place] monitor in place tracing contraction pattern    Problem: Pain - Acute:  Goal: Able to cope with pain  Able to cope with pain  Outcome: Ongoing  Patient denies pain at this time, planning on a labor epidural    Problem: Tissue Perfusion - Uteroplacental, Altered:  Goal: Absence of abnormal fetal heart rate pattern  Absence of abnormal fetal heart rate pattern  Outcome: Ongoing  External US in place tracing FHTs, FHTs reactive    Problem: Urinary Retention:  Goal: Urinary elimination within specified parameters  Urinary elimination within specified parameters  Outcome: Ongoing  Patient voiding with ease, quantities sufficient. Denies dysuria. Problem: Falls - Risk of:  Goal: Will remain free from falls  Will remain free from falls  Outcome: Ongoing  Patient aware to call out for assistance with ambulation to BR. Bed locked in lowest position, call light within reach.     Problem: Discharge Planning:  Goal: Discharged to appropriate level of care  Discharged to appropriate level of
Problem: Fluid Volume - Imbalance:  Goal: Absence of postpartum hemorrhage signs and symptoms  Absence of postpartum hemorrhage signs and symptoms   Outcome: Ongoing  Pt having small amount of vaginal bleeding    Problem: Pain - Acute:  Goal: Pain level will decrease  Pain level will decrease   Outcome: Ongoing  Pt taking oral pain meds as needed    Problem: Urinary Retention:  Goal: Urinary elimination within specified parameters  Urinary elimination within specified parameters   Outcome: Ongoing  Brooks draining without difficulty    Problem: Discharge Planning:  Goal: Discharged to appropriate level of care  Discharged to appropriate level of care   Outcome: Ongoing  Working toward discharge    Problem: Infection - Surgical Site:  Goal: Will show no infection signs and symptoms  Will show no infection signs and symptoms   Outcome: Ongoing  Vital signs stable. No foul vaginal discharge. Dressing remains clean and dry    Problem: Mood - Altered:  Goal: Mood stable  Mood stable   Outcome: Ongoing  Pt calm and cooperative    Problem: Nausea/Vomiting:  Goal: Absence of nausea/vomiting  Absence of nausea/vomiting   Outcome: Ongoing  No problems noted    Problem: Venous Thromboembolism:  Goal: Will show no signs or symptoms of venous thromboembolism  Will show no signs or symptoms of venous thromboembolism   Outcome: Ongoing  fiona's negative    Comments:   Care plan reviewed with patient and she contributes to goal setting and voices understanding of plan of care.
Problem: Fluid Volume - Imbalance:  Goal: Absence of postpartum hemorrhage signs and symptoms  Absence of postpartum hemorrhage signs and symptoms   Outcome: Ongoing  Vaginal bleeding WNL, no clots or foul odors. Problem: Pain - Acute:  Goal: Pain level will decrease  Pain level will decrease   Outcome: Ongoing  Pain controlled with po meds        Problem: Urinary Retention:  Goal: Urinary elimination within specified parameters  Urinary elimination within specified parameters   Outcome: Ongoing  DTV X1    Problem: Falls - Risk of:  Goal: Will remain free from falls  Will remain free from falls   Outcome: Ongoing  No falls this shift     Problem: Discharge Planning:  Goal: Discharged to appropriate level of care  Discharged to appropriate level of care   Outcome: Ongoing  No discharge anticipated at this time    Problem: Infection - Surgical Site:  Goal: Will show no infection signs and symptoms  Will show no infection signs and symptoms   Outcome: Ongoing  Vital signs and assessments WNL. Problem: Mood - Altered:  Goal: Mood stable  Mood stable   Outcome: Ongoing  Bonding with baby, participating in infant care. Problem: Nausea/Vomiting:  Goal: Absence of nausea/vomiting  Absence of nausea/vomiting   Outcome: Ongoing  No nausea or vomiting    Problem: Venous Thromboembolism:  Goal: Will show no signs or symptoms of venous thromboembolism  Will show no signs or symptoms of venous thromboembolism   Outcome: Ongoing  Negative homans and SCD's on    Comments:   Care plan reviewed with patient. Patient verbalizes understanding of the plan of care and contribute to goal setting.
cooperative. Problem: Tissue Perfusion - Uteroplacental, Altered:  Goal: Absence of abnormal fetal heart rate pattern  Absence of abnormal fetal heart rate pattern   Outcome: Ongoing  FHT with FSE.  will remain absent of abnormal fetal heart rate patterns by cooperating with RN's uteroplacental recessitation when necessary throughout labor as evidence by a reactive or reassuring fetal heart rate tracing. Problem: Urinary Retention:  Goal: Urinary elimination within specified parameters  Urinary elimination within specified parameters   Outcome: Ongoing  Brooks draining adequate urine. will continue to eliminate urine adequately throughout labor as evidence by adequate urinary output. Problem: Falls - Risk of:  Goal: Will remain free from falls  Will remain free from falls   Outcome: Ongoing  Pt. Remains free from falls at this time. IV infusing per order. RN encouraged pt. To call for assistance to BR. Side rails up X2. Call light within reach. S.O. At bedside. RN will continue to provide for a safe environment. Problem: Discharge Planning:  Goal: Discharged to appropriate level of care  Discharged to appropriate level of care   Outcome: Ongoing  will be transferred to postpartum after delivery. After vaginal delivery, she will stay for 24-48 hours and then be discharged home. Comments:   Care plan reviewed with patient and . Patient and  verbalize understanding of the plan of care and contribute to goal setting.

## 2017-10-07 NOTE — PROGRESS NOTES
Subjective:     Postpartum Day 1:  Delivery    Patient doing well. Pain well controlled. Mild lochia. Infant still in SCN. Urination without difficulty. Positive flatus and no bowel movement. Objective:        Vitals:    10/07/17 0836   BP: 109/70   Pulse: 73   Resp: 18   Temp: 97.8 °F (36.6 °C)   SpO2:          General:    alert, appears stated age and cooperative       Abdomen: Soft, nontender. Incision:  Dressing dry   Extremities:  warm and dry. No edema. CBC   Lab Results   Component Value Date    WBC 9.9 10/04/2017    HGB 9.6 (L) 10/07/2017    HCT 34.8 (L) 10/04/2017    MCV 89.2 10/04/2017     10/04/2017        Assessment:     Status post  section. Doing well postoperatively. Plan:     Continue current care.       Ritesh Alcazar DO

## 2017-10-08 VITALS
SYSTOLIC BLOOD PRESSURE: 117 MMHG | RESPIRATION RATE: 16 BRPM | HEART RATE: 80 BPM | TEMPERATURE: 98.2 F | OXYGEN SATURATION: 98 % | DIASTOLIC BLOOD PRESSURE: 71 MMHG | WEIGHT: 181 LBS | BODY MASS INDEX: 33.31 KG/M2 | HEIGHT: 62 IN

## 2017-10-08 LAB
ANISOCYTOSIS: ABNORMAL
BASOPHILS # BLD: 0.7 %
BASOPHILS ABSOLUTE: 0.1 THOU/MM3 (ref 0–0.1)
EOSINOPHIL # BLD: 2 %
EOSINOPHILS ABSOLUTE: 0.2 THOU/MM3 (ref 0–0.4)
HCT VFR BLD CALC: 30.1 % (ref 37–47)
HEMOGLOBIN: 10.3 GM/DL (ref 12–16)
LYMPHOCYTES # BLD: 22.7 %
LYMPHOCYTES ABSOLUTE: 2.2 THOU/MM3 (ref 1–4.8)
MCH RBC QN AUTO: 31.1 PG (ref 27–31)
MCHC RBC AUTO-ENTMCNC: 34.2 GM/DL (ref 33–37)
MCV RBC AUTO: 90.9 FL (ref 81–99)
MONOCYTES # BLD: 4.3 %
MONOCYTES ABSOLUTE: 0.4 THOU/MM3 (ref 0.4–1.3)
NUCLEATED RED BLOOD CELLS: 0 /100 WBC
PDW BLD-RTO: 15.5 % (ref 11.5–14.5)
PLATELET # BLD: 176 THOU/MM3 (ref 130–400)
PMV BLD AUTO: 7.9 MCM (ref 7.4–10.4)
RBC # BLD: 3.31 MILL/MM3 (ref 4.2–5.4)
RBC # BLD: NORMAL 10*6/UL
SEG NEUTROPHILS: 70.3 %
SEGMENTED NEUTROPHILS ABSOLUTE COUNT: 6.8 THOU/MM3 (ref 1.8–7.7)
WBC # BLD: 9.7 THOU/MM3 (ref 4.8–10.8)

## 2017-10-08 PROCEDURE — 6370000000 HC RX 637 (ALT 250 FOR IP): Performed by: OBSTETRICS & GYNECOLOGY

## 2017-10-08 PROCEDURE — 85025 COMPLETE CBC W/AUTO DIFF WBC: CPT

## 2017-10-08 PROCEDURE — 36415 COLL VENOUS BLD VENIPUNCTURE: CPT

## 2017-10-08 RX ORDER — IBUPROFEN 600 MG/1
600 TABLET ORAL EVERY 6 HOURS PRN
Qty: 60 TABLET | Refills: 1 | Status: ON HOLD | OUTPATIENT
Start: 2017-10-08 | End: 2019-05-01

## 2017-10-08 RX ORDER — HYDROCODONE BITARTRATE AND ACETAMINOPHEN 5; 325 MG/1; MG/1
1 TABLET ORAL EVERY 4 HOURS PRN
Qty: 10 TABLET | Refills: 0 | Status: SHIPPED | OUTPATIENT
Start: 2017-10-08 | End: 2017-10-15

## 2017-10-08 RX ADMIN — DOCUSATE SODIUM 100 MG: 100 CAPSULE, LIQUID FILLED ORAL at 09:34

## 2017-10-08 RX ADMIN — IBUPROFEN 800 MG: 800 TABLET, FILM COATED ORAL at 04:13

## 2017-10-08 RX ADMIN — PRENATAL WITH FERROUS FUM AND FOLIC ACID 1 TABLET: 3080; 920; 120; 400; 22; 1.84; 3; 20; 10; 1; 12; 200; 27; 25; 2 TABLET ORAL at 09:34

## 2017-10-08 RX ADMIN — IBUPROFEN 800 MG: 800 TABLET, FILM COATED ORAL at 12:53

## 2017-10-08 ASSESSMENT — PAIN SCALES - GENERAL
PAINLEVEL_OUTOF10: 1
PAINLEVEL_OUTOF10: 4

## 2017-10-08 NOTE — PROGRESS NOTES
Subjective:     Postpartum Day 2:  Delivery    Patient doing well. Pain well controlled. Mild lochia. Breastfeeding without difficulty. Urination without difficulty. Positive flatus and bowel movement. Objective:        Vitals:    10/08/17 0925   BP: 117/71   Pulse: 80   Resp: 16   Temp: 98.2 °F (36.8 °C)   SpO2:          General:    alert, appears stated age and cooperative       Abdomen:  deferred due to infant            Extremities:  warm and dry. No edema. CBC   Lab Results   Component Value Date    WBC 9.7 10/08/2017    HGB 10.3 (L) 10/08/2017    HCT 30.1 (L) 10/08/2017    MCV 90.9 10/08/2017     10/08/2017        Assessment:     Status post  section. Doing well postoperatively. Plan:     Discharge home with standard precautions and return to clinic in 1-2 weeks.       Michoacano Galeana DO

## 2017-10-08 NOTE — FLOWSHEET NOTE
Discharge prescriptions given to pt with instructions on use and side effects. See AVS. Pt verbalized understanding of medications. Postpartum  teaching completed and forms signed by patient. Copy witnessed by RN and given to patient. Patient verbalized understanding of all teaching points. Patient plans to follow-up with [facility] Provider as instructed. Patient verbalizes understanding of discharge instructions and denies further questions. ID bands checked. Patient discharged in stable condition accompanied by family/guardian. Discharged in wheelchair, holding baby in arms.

## 2017-10-08 NOTE — DISCHARGE SUMMARY
C/Section Discharge Summary    Gestational Age:41w1d    Antepartum complications: post-term    Date of Delivery: 10/4/2017  7:47 PM      Type of Delivery: See dictated operative note    Labs: CBC   Lab Results   Component Value Date    WBC 9.7 10/08/2017    HGB 10.3 (L) 10/08/2017    HCT 30.1 (L) 10/08/2017     10/08/2017        Intrapartum complications: Failure to Progress    Postpartum complications: none    The patient is ambulating well. The patient is tolerating a normal diet. Discharge Medication:    Xavier Michel   Home Medication Instructions [id number]    Printed on:10/08/17 1341   Medication Information                      HYDROcodone-acetaminophen (NORCO) 5-325 MG per tablet  Take 1 tablet by mouth every 4 hours as needed for Pain .              ibuprofen (ADVIL;MOTRIN) 600 MG tablet  Take 1 tablet by mouth every 6 hours as needed for Pain             Prenatal MV-Min-Fe Fum-FA-DHA (PRENATAL 1 PO)  Take 1 tablet by mouth daily                  Discharge Date: 10/8/17    Plan:   Follow up in 1 week(s)    Rosa Benjamin DO

## 2017-12-05 NOTE — ADDENDUM NOTE
Addendum  created 12/05/17 6744 by Isatu Montelongo CRNA    Anesthesia Intra Blocks edited, Delete clinical note, LDA deleted via procedure documentation, Order Canceled from Note

## 2019-05-01 ENCOUNTER — ANESTHESIA EVENT (OUTPATIENT)
Dept: LABOR AND DELIVERY | Age: 29
End: 2019-05-01
Payer: COMMERCIAL

## 2019-05-01 ENCOUNTER — HOSPITAL ENCOUNTER (INPATIENT)
Age: 29
LOS: 3 days | Discharge: HOME | End: 2019-05-04
Attending: OBSTETRICS & GYNECOLOGY | Admitting: OBSTETRICS & GYNECOLOGY
Payer: COMMERCIAL

## 2019-05-01 ENCOUNTER — ANESTHESIA (OUTPATIENT)
Dept: LABOR AND DELIVERY | Age: 29
End: 2019-05-01
Payer: COMMERCIAL

## 2019-05-01 VITALS — OXYGEN SATURATION: 97 % | SYSTOLIC BLOOD PRESSURE: 107 MMHG | DIASTOLIC BLOOD PRESSURE: 59 MMHG

## 2019-05-01 DIAGNOSIS — G89.18 ACUTE POST-OPERATIVE PAIN: Primary | ICD-10-CM

## 2019-05-01 LAB
ABO: NORMAL
AMPHETAMINE+METHAMPHETAMINE URINE SCREEN: NEGATIVE
ANTIBODY SCREEN: NORMAL
BARBITURATE QUANTITATIVE URINE: NEGATIVE
BENZODIAZEPINE QUANTITATIVE URINE: NEGATIVE
CANNABINOID QUANTITATIVE URINE: NEGATIVE
COCAINE METABOLITE QUANTITATIVE URINE: NEGATIVE
ERYTHROCYTE [DISTWIDTH] IN BLOOD BY AUTOMATED COUNT: 14.4 % (ref 11.5–14.5)
ERYTHROCYTE [DISTWIDTH] IN BLOOD BY AUTOMATED COUNT: 45.3 FL (ref 35–45)
HCT VFR BLD CALC: 32.3 % (ref 37–47)
HEMOGLOBIN: 10.9 GM/DL (ref 12–16)
MCH RBC QN AUTO: 29.6 PG (ref 26–33)
MCHC RBC AUTO-ENTMCNC: 33.7 GM/DL (ref 32.2–35.5)
MCV RBC AUTO: 87.8 FL (ref 81–99)
OPIATES, URINE: NEGATIVE
OXYCODONE: NEGATIVE
PHENCYCLIDINE QUANTITATIVE URINE: NEGATIVE
PLATELET # BLD: 140 THOU/MM3 (ref 130–400)
PMV BLD AUTO: 9.9 FL (ref 9.4–12.4)
RBC # BLD: 3.68 MILL/MM3 (ref 4.2–5.4)
RH FACTOR: NORMAL
RPR: NONREACTIVE
WBC # BLD: 6.4 THOU/MM3 (ref 4.8–10.8)

## 2019-05-01 PROCEDURE — 6360000002 HC RX W HCPCS: Performed by: NURSE ANESTHETIST, CERTIFIED REGISTERED

## 2019-05-01 PROCEDURE — 2580000003 HC RX 258: Performed by: NURSE ANESTHETIST, CERTIFIED REGISTERED

## 2019-05-01 PROCEDURE — 6360000002 HC RX W HCPCS: Performed by: OBSTETRICS & GYNECOLOGY

## 2019-05-01 PROCEDURE — 2500000003 HC RX 250 WO HCPCS

## 2019-05-01 PROCEDURE — 1220000000 HC SEMI PRIVATE OB R&B

## 2019-05-01 PROCEDURE — 2580000003 HC RX 258: Performed by: OBSTETRICS & GYNECOLOGY

## 2019-05-01 PROCEDURE — 86901 BLOOD TYPING SEROLOGIC RH(D): CPT

## 2019-05-01 PROCEDURE — 6370000000 HC RX 637 (ALT 250 FOR IP): Performed by: OBSTETRICS & GYNECOLOGY

## 2019-05-01 PROCEDURE — 3700000001 HC ADD 15 MINUTES (ANESTHESIA): Performed by: OBSTETRICS & GYNECOLOGY

## 2019-05-01 PROCEDURE — 3700000000 HC ANESTHESIA ATTENDED CARE: Performed by: OBSTETRICS & GYNECOLOGY

## 2019-05-01 PROCEDURE — 85027 COMPLETE CBC AUTOMATED: CPT

## 2019-05-01 PROCEDURE — 2709999900 HC NON-CHARGEABLE SUPPLY

## 2019-05-01 PROCEDURE — 80305 DRUG TEST PRSMV DIR OPT OBS: CPT

## 2019-05-01 PROCEDURE — 2500000003 HC RX 250 WO HCPCS: Performed by: NURSE ANESTHETIST, CERTIFIED REGISTERED

## 2019-05-01 PROCEDURE — 86592 SYPHILIS TEST NON-TREP QUAL: CPT

## 2019-05-01 PROCEDURE — 7100000001 HC PACU RECOVERY - ADDTL 15 MIN: Performed by: OBSTETRICS & GYNECOLOGY

## 2019-05-01 PROCEDURE — 7100000000 HC PACU RECOVERY - FIRST 15 MIN: Performed by: OBSTETRICS & GYNECOLOGY

## 2019-05-01 PROCEDURE — 6360000002 HC RX W HCPCS

## 2019-05-01 PROCEDURE — 36415 COLL VENOUS BLD VENIPUNCTURE: CPT

## 2019-05-01 PROCEDURE — 6360000002 HC RX W HCPCS: Performed by: ANESTHESIOLOGY

## 2019-05-01 PROCEDURE — 86900 BLOOD TYPING SEROLOGIC ABO: CPT

## 2019-05-01 PROCEDURE — 3609079900 HC CESAREAN SECTION: Performed by: OBSTETRICS & GYNECOLOGY

## 2019-05-01 PROCEDURE — 2709999900 HC NON-CHARGEABLE SUPPLY: Performed by: OBSTETRICS & GYNECOLOGY

## 2019-05-01 PROCEDURE — 86850 RBC ANTIBODY SCREEN: CPT

## 2019-05-01 RX ORDER — MORPHINE SULFATE 10 MG/ML
INJECTION, SOLUTION INTRAMUSCULAR; INTRAVENOUS PRN
Status: DISCONTINUED | OUTPATIENT
Start: 2019-05-01 | End: 2019-05-01 | Stop reason: SDUPTHER

## 2019-05-01 RX ORDER — FERROUS SULFATE 325(65) MG
325 TABLET ORAL DAILY
Status: DISCONTINUED | OUTPATIENT
Start: 2019-05-01 | End: 2019-05-04 | Stop reason: HOSPADM

## 2019-05-01 RX ORDER — KETOROLAC TROMETHAMINE 30 MG/ML
15 INJECTION, SOLUTION INTRAMUSCULAR; INTRAVENOUS EVERY 6 HOURS
Status: COMPLETED | OUTPATIENT
Start: 2019-05-01 | End: 2019-05-02

## 2019-05-01 RX ORDER — SODIUM CHLORIDE 0.9 % (FLUSH) 0.9 %
10 SYRINGE (ML) INJECTION EVERY 12 HOURS SCHEDULED
Status: DISCONTINUED | OUTPATIENT
Start: 2019-05-01 | End: 2019-05-01 | Stop reason: SDUPTHER

## 2019-05-01 RX ORDER — IBUPROFEN 800 MG/1
800 TABLET ORAL EVERY 8 HOURS PRN
Status: DISCONTINUED | OUTPATIENT
Start: 2019-05-02 | End: 2019-05-04 | Stop reason: HOSPADM

## 2019-05-01 RX ORDER — CARBOPROST TROMETHAMINE 250 UG/ML
250 INJECTION, SOLUTION INTRAMUSCULAR PRN
Status: DISCONTINUED | OUTPATIENT
Start: 2019-05-01 | End: 2019-05-04 | Stop reason: HOSPADM

## 2019-05-01 RX ORDER — SODIUM CHLORIDE, SODIUM LACTATE, POTASSIUM CHLORIDE, CALCIUM CHLORIDE 600; 310; 30; 20 MG/100ML; MG/100ML; MG/100ML; MG/100ML
INJECTION, SOLUTION INTRAVENOUS CONTINUOUS PRN
Status: DISCONTINUED | OUTPATIENT
Start: 2019-05-01 | End: 2019-05-01 | Stop reason: SDUPTHER

## 2019-05-01 RX ORDER — ONDANSETRON 2 MG/ML
INJECTION INTRAMUSCULAR; INTRAVENOUS PRN
Status: DISCONTINUED | OUTPATIENT
Start: 2019-05-01 | End: 2019-05-01 | Stop reason: SDUPTHER

## 2019-05-01 RX ORDER — BUPIVACAINE HYDROCHLORIDE 7.5 MG/ML
INJECTION, SOLUTION INTRASPINAL PRN
Status: DISCONTINUED | OUTPATIENT
Start: 2019-05-01 | End: 2019-05-01 | Stop reason: SDUPTHER

## 2019-05-01 RX ORDER — OXYCODONE HYDROCHLORIDE 5 MG/1
5 TABLET ORAL EVERY 4 HOURS PRN
Status: DISPENSED | OUTPATIENT
Start: 2019-05-01 | End: 2019-05-02

## 2019-05-01 RX ORDER — FENTANYL CITRATE 50 UG/ML
50 INJECTION, SOLUTION INTRAMUSCULAR; INTRAVENOUS EVERY 5 MIN PRN
Status: DISCONTINUED | OUTPATIENT
Start: 2019-05-01 | End: 2019-05-01 | Stop reason: HOSPADM

## 2019-05-01 RX ORDER — HYDROCODONE BITARTRATE AND ACETAMINOPHEN 5; 325 MG/1; MG/1
2 TABLET ORAL EVERY 4 HOURS PRN
Status: DISCONTINUED | OUTPATIENT
Start: 2019-05-02 | End: 2019-05-04 | Stop reason: HOSPADM

## 2019-05-01 RX ORDER — SODIUM CHLORIDE, SODIUM LACTATE, POTASSIUM CHLORIDE, CALCIUM CHLORIDE 600; 310; 30; 20 MG/100ML; MG/100ML; MG/100ML; MG/100ML
INJECTION, SOLUTION INTRAVENOUS CONTINUOUS
Status: DISCONTINUED | OUTPATIENT
Start: 2019-05-01 | End: 2019-05-04 | Stop reason: HOSPADM

## 2019-05-01 RX ORDER — METHYLERGONOVINE MALEATE 0.2 MG/ML
200 INJECTION INTRAVENOUS PRN
Status: DISCONTINUED | OUTPATIENT
Start: 2019-05-01 | End: 2019-05-04 | Stop reason: HOSPADM

## 2019-05-01 RX ORDER — SODIUM CHLORIDE 0.9 % (FLUSH) 0.9 %
10 SYRINGE (ML) INJECTION PRN
Status: DISCONTINUED | OUTPATIENT
Start: 2019-05-01 | End: 2019-05-01 | Stop reason: HOSPADM

## 2019-05-01 RX ORDER — OXYTOCIN 10 [USP'U]/ML
INJECTION, SOLUTION INTRAMUSCULAR; INTRAVENOUS PRN
Status: DISCONTINUED | OUTPATIENT
Start: 2019-05-01 | End: 2019-05-01 | Stop reason: SDUPTHER

## 2019-05-01 RX ORDER — ONDANSETRON 2 MG/ML
4 INJECTION INTRAMUSCULAR; INTRAVENOUS EVERY 6 HOURS PRN
Status: DISCONTINUED | OUTPATIENT
Start: 2019-05-01 | End: 2019-05-01 | Stop reason: HOSPADM

## 2019-05-01 RX ORDER — FENTANYL CITRATE 50 UG/ML
INJECTION, SOLUTION INTRAMUSCULAR; INTRAVENOUS PRN
Status: DISCONTINUED | OUTPATIENT
Start: 2019-05-01 | End: 2019-05-01 | Stop reason: SDUPTHER

## 2019-05-01 RX ORDER — DOCUSATE SODIUM 100 MG/1
100 CAPSULE, LIQUID FILLED ORAL 2 TIMES DAILY
Status: DISCONTINUED | OUTPATIENT
Start: 2019-05-01 | End: 2019-05-04 | Stop reason: HOSPADM

## 2019-05-01 RX ORDER — DEXAMETHASONE SODIUM PHOSPHATE 4 MG/ML
INJECTION, SOLUTION INTRA-ARTICULAR; INTRALESIONAL; INTRAMUSCULAR; INTRAVENOUS; SOFT TISSUE PRN
Status: DISCONTINUED | OUTPATIENT
Start: 2019-05-01 | End: 2019-05-01 | Stop reason: SDUPTHER

## 2019-05-01 RX ORDER — ACETAMINOPHEN 325 MG/1
650 TABLET ORAL EVERY 4 HOURS PRN
Status: DISCONTINUED | OUTPATIENT
Start: 2019-05-01 | End: 2019-05-01 | Stop reason: SDUPTHER

## 2019-05-01 RX ORDER — ONDANSETRON 2 MG/ML
4 INJECTION INTRAMUSCULAR; INTRAVENOUS
Status: DISCONTINUED | OUTPATIENT
Start: 2019-05-01 | End: 2019-05-01 | Stop reason: HOSPADM

## 2019-05-01 RX ORDER — NALOXONE HYDROCHLORIDE 0.4 MG/ML
0.4 INJECTION, SOLUTION INTRAMUSCULAR; INTRAVENOUS; SUBCUTANEOUS PRN
Status: DISCONTINUED | OUTPATIENT
Start: 2019-05-02 | End: 2019-05-04 | Stop reason: HOSPADM

## 2019-05-01 RX ORDER — LANOLIN 100 %
OINTMENT (GRAM) TOPICAL
Status: DISCONTINUED | OUTPATIENT
Start: 2019-05-01 | End: 2019-05-04 | Stop reason: HOSPADM

## 2019-05-01 RX ORDER — MORPHINE SULFATE 4 MG/ML
2 INJECTION, SOLUTION INTRAMUSCULAR; INTRAVENOUS
Status: DISCONTINUED | OUTPATIENT
Start: 2019-05-02 | End: 2019-05-04 | Stop reason: HOSPADM

## 2019-05-01 RX ORDER — LABETALOL HYDROCHLORIDE 5 MG/ML
5 INJECTION, SOLUTION INTRAVENOUS EVERY 10 MIN PRN
Status: DISCONTINUED | OUTPATIENT
Start: 2019-05-01 | End: 2019-05-01 | Stop reason: HOSPADM

## 2019-05-01 RX ORDER — FENTANYL CITRATE 50 UG/ML
25 INJECTION, SOLUTION INTRAMUSCULAR; INTRAVENOUS EVERY 5 MIN PRN
Status: DISCONTINUED | OUTPATIENT
Start: 2019-05-01 | End: 2019-05-01 | Stop reason: HOSPADM

## 2019-05-01 RX ORDER — SODIUM CHLORIDE, SODIUM LACTATE, POTASSIUM CHLORIDE, CALCIUM CHLORIDE 600; 310; 30; 20 MG/100ML; MG/100ML; MG/100ML; MG/100ML
INJECTION, SOLUTION INTRAVENOUS CONTINUOUS
Status: DISCONTINUED | OUTPATIENT
Start: 2019-05-01 | End: 2019-05-01

## 2019-05-01 RX ORDER — KETOROLAC TROMETHAMINE 30 MG/ML
INJECTION, SOLUTION INTRAMUSCULAR; INTRAVENOUS PRN
Status: DISCONTINUED | OUTPATIENT
Start: 2019-05-01 | End: 2019-05-01 | Stop reason: SDUPTHER

## 2019-05-01 RX ORDER — KETOROLAC TROMETHAMINE 30 MG/ML
30 INJECTION, SOLUTION INTRAMUSCULAR; INTRAVENOUS EVERY 6 HOURS
Status: DISCONTINUED | OUTPATIENT
Start: 2019-05-02 | End: 2019-05-04 | Stop reason: HOSPADM

## 2019-05-01 RX ORDER — NALBUPHINE HCL 10 MG/ML
5 AMPUL (ML) INJECTION EVERY 4 HOURS PRN
Status: ACTIVE | OUTPATIENT
Start: 2019-05-01 | End: 2019-05-02

## 2019-05-01 RX ORDER — MEPERIDINE HYDROCHLORIDE 25 MG/ML
12.5 INJECTION INTRAMUSCULAR; INTRAVENOUS; SUBCUTANEOUS EVERY 5 MIN PRN
Status: DISCONTINUED | OUTPATIENT
Start: 2019-05-01 | End: 2019-05-01 | Stop reason: HOSPADM

## 2019-05-01 RX ORDER — SODIUM CHLORIDE 0.9 % (FLUSH) 0.9 %
10 SYRINGE (ML) INJECTION PRN
Status: DISCONTINUED | OUTPATIENT
Start: 2019-05-01 | End: 2019-05-04 | Stop reason: HOSPADM

## 2019-05-01 RX ORDER — GLYCOPYRROLATE 1 MG/5 ML
SYRINGE (ML) INTRAVENOUS PRN
Status: DISCONTINUED | OUTPATIENT
Start: 2019-05-01 | End: 2019-05-01 | Stop reason: SDUPTHER

## 2019-05-01 RX ORDER — ACETAMINOPHEN 325 MG/1
650 TABLET ORAL EVERY 4 HOURS PRN
Status: DISCONTINUED | OUTPATIENT
Start: 2019-05-01 | End: 2019-05-04 | Stop reason: HOSPADM

## 2019-05-01 RX ORDER — HYDROCODONE BITARTRATE AND ACETAMINOPHEN 5; 325 MG/1; MG/1
1 TABLET ORAL EVERY 4 HOURS PRN
Status: DISCONTINUED | OUTPATIENT
Start: 2019-05-02 | End: 2019-05-04 | Stop reason: HOSPADM

## 2019-05-01 RX ORDER — CEFAZOLIN SODIUM 1 G/50ML
1 INJECTION, SOLUTION INTRAVENOUS ONCE
Status: COMPLETED | OUTPATIENT
Start: 2019-05-01 | End: 2019-05-01

## 2019-05-01 RX ORDER — SODIUM CHLORIDE 0.9 % (FLUSH) 0.9 %
10 SYRINGE (ML) INJECTION EVERY 12 HOURS SCHEDULED
Status: DISCONTINUED | OUTPATIENT
Start: 2019-05-01 | End: 2019-05-04 | Stop reason: HOSPADM

## 2019-05-01 RX ORDER — ONDANSETRON 2 MG/ML
4 INJECTION INTRAMUSCULAR; INTRAVENOUS EVERY 6 HOURS PRN
Status: DISPENSED | OUTPATIENT
Start: 2019-05-01 | End: 2019-05-02

## 2019-05-01 RX ORDER — NALOXONE HYDROCHLORIDE 0.4 MG/ML
0.4 INJECTION, SOLUTION INTRAMUSCULAR; INTRAVENOUS; SUBCUTANEOUS PRN
Status: ACTIVE | OUTPATIENT
Start: 2019-05-01 | End: 2019-05-02

## 2019-05-01 RX ORDER — TRISODIUM CITRATE DIHYDRATE AND CITRIC ACID MONOHYDRATE 500; 334 MG/5ML; MG/5ML
30 SOLUTION ORAL ONCE
Status: COMPLETED | OUTPATIENT
Start: 2019-05-01 | End: 2019-05-01

## 2019-05-01 RX ORDER — OXYCODONE HYDROCHLORIDE 5 MG/1
10 TABLET ORAL EVERY 4 HOURS PRN
Status: ACTIVE | OUTPATIENT
Start: 2019-05-01 | End: 2019-05-02

## 2019-05-01 RX ORDER — MORPHINE SULFATE 4 MG/ML
4 INJECTION, SOLUTION INTRAMUSCULAR; INTRAVENOUS
Status: DISCONTINUED | OUTPATIENT
Start: 2019-05-02 | End: 2019-05-04 | Stop reason: HOSPADM

## 2019-05-01 RX ORDER — PROMETHAZINE HYDROCHLORIDE 25 MG/ML
6.25 INJECTION, SOLUTION INTRAMUSCULAR; INTRAVENOUS
Status: DISCONTINUED | OUTPATIENT
Start: 2019-05-01 | End: 2019-05-01 | Stop reason: HOSPADM

## 2019-05-01 RX ADMIN — KETOROLAC TROMETHAMINE 30 MG: 60 INJECTION, SOLUTION INTRAMUSCULAR at 11:04

## 2019-05-01 RX ADMIN — SODIUM CHLORIDE, POTASSIUM CHLORIDE, SODIUM LACTATE AND CALCIUM CHLORIDE: 600; 310; 30; 20 INJECTION, SOLUTION INTRAVENOUS at 08:48

## 2019-05-01 RX ADMIN — SODIUM CITRATE AND CITRIC ACID MONOHYDRATE 15 ML: 500; 334 SOLUTION ORAL at 10:01

## 2019-05-01 RX ADMIN — FENTANYL CITRATE 20 MCG: 50 INJECTION INTRAMUSCULAR; INTRAVENOUS at 10:13

## 2019-05-01 RX ADMIN — ONDANSETRON 4 MG: 2 INJECTION INTRAMUSCULAR; INTRAVENOUS at 15:34

## 2019-05-01 RX ADMIN — ONDANSETRON HYDROCHLORIDE 4 MG: 4 INJECTION, SOLUTION INTRAMUSCULAR; INTRAVENOUS at 10:37

## 2019-05-01 RX ADMIN — SODIUM CHLORIDE, POTASSIUM CHLORIDE, SODIUM LACTATE AND CALCIUM CHLORIDE: 600; 310; 30; 20 INJECTION, SOLUTION INTRAVENOUS at 10:13

## 2019-05-01 RX ADMIN — PHENYLEPHRINE HYDROCHLORIDE 50 MCG: 10 INJECTION INTRAVENOUS at 10:15

## 2019-05-01 RX ADMIN — PHENYLEPHRINE HYDROCHLORIDE 50 MCG: 10 INJECTION INTRAVENOUS at 10:20

## 2019-05-01 RX ADMIN — KETOROLAC TROMETHAMINE 15 MG: 30 INJECTION, SOLUTION INTRAMUSCULAR at 17:26

## 2019-05-01 RX ADMIN — FAMOTIDINE 20 MG: 10 INJECTION, SOLUTION INTRAVENOUS at 09:27

## 2019-05-01 RX ADMIN — SODIUM CHLORIDE, POTASSIUM CHLORIDE, SODIUM LACTATE AND CALCIUM CHLORIDE: 600; 310; 30; 20 INJECTION, SOLUTION INTRAVENOUS at 08:10

## 2019-05-01 RX ADMIN — SODIUM CHLORIDE, POTASSIUM CHLORIDE, SODIUM LACTATE AND CALCIUM CHLORIDE: 600; 310; 30; 20 INJECTION, SOLUTION INTRAVENOUS at 15:41

## 2019-05-01 RX ADMIN — CEFAZOLIN SODIUM 1 G: 1 INJECTION, SOLUTION INTRAVENOUS at 10:02

## 2019-05-01 RX ADMIN — KETOROLAC TROMETHAMINE 30 MG: 60 INJECTION, SOLUTION INTRAMUSCULAR at 11:05

## 2019-05-01 RX ADMIN — OXYTOCIN 20 UNITS: 10 INJECTION, SOLUTION INTRAMUSCULAR; INTRAVENOUS at 10:36

## 2019-05-01 RX ADMIN — MORPHINE SULFATE 0.2 MG: 10 INJECTION INTRAVENOUS at 10:13

## 2019-05-01 RX ADMIN — DEXAMETHASONE SODIUM PHOSPHATE 4 MG: 4 INJECTION, SOLUTION INTRAMUSCULAR; INTRAVENOUS at 10:37

## 2019-05-01 RX ADMIN — SODIUM CHLORIDE, POTASSIUM CHLORIDE, SODIUM LACTATE AND CALCIUM CHLORIDE: 600; 310; 30; 20 INJECTION, SOLUTION INTRAVENOUS at 23:18

## 2019-05-01 RX ADMIN — PHENYLEPHRINE HYDROCHLORIDE 50 MCG: 10 INJECTION INTRAVENOUS at 10:25

## 2019-05-01 RX ADMIN — BUPIVACAINE HYDROCHLORIDE 1.6 ML: 7.5 INJECTION, SOLUTION SUBARACHNOID at 10:13

## 2019-05-01 RX ADMIN — Medication 0.2 MG: at 10:15

## 2019-05-01 RX ADMIN — Medication 999 MILLI-UNITS/MIN: at 11:25

## 2019-05-01 ASSESSMENT — PULMONARY FUNCTION TESTS
PIF_VALUE: 0

## 2019-05-01 ASSESSMENT — PAIN SCALES - GENERAL
PAINLEVEL_OUTOF10: 1
PAINLEVEL_OUTOF10: 0

## 2019-05-01 NOTE — ANESTHESIA PRE PROCEDURE
Department of Anesthesiology  Preprocedure Note       Name:  Shiraz Candelaria   Age:  34 y.o.  :  1990                                          MRN:  [id number]         Date:  2019      Surgeon: Miryam Michelle):  Deandra Vo DO    Procedure:  SECTION (N/A Uterus)    Medications prior to admission:   Prior to Admission medications    Medication Sig Start Date End Date Taking? Authorizing Provider   Prenatal MV-Min-Fe Fum-FA-DHA (PRENATAL 1 PO) Take 1 tablet by mouth daily   Yes Historical Provider, MD       Current medications:    Current Facility-Administered Medications   Medication Dose Route Frequency Provider Last Rate Last Dose    sodium chloride flush 0.9 % injection 10 mL  10 mL Intravenous PRN Deandra Buys, DO        oxytocin (PITOCIN) 30 units in 500 mL infusion  1 susie-units/min Intravenous Continuous Deandra Buys, DO        [facility] injection 4 mg  4 mg Intravenous Q6H PRN Deandra Buys, DO        lactated ringers infusion   Intravenous Continuous Deandra Buys,  mL/hr at 19 0848      famotidine (PEPCID) injection 20 mg  20 mg Intravenous BID Deandra Buys, DO   20 mg at 19 4371     Facility-Administered Medications Ordered in Other Encounters   Medication Dose Route Frequency Provider Last Rate Last Dose    bupivacaine 0.75% in dextrose 8.25% (intrathecal) (SENSORCAINE) 0.75-8.25 % injection    PRN Trent Jeffery APRN - CRNA   1.6 mL at 19 1013    fentaNYL (SUBLIMAZE) injection    PRN Trent Jeffery APRN - CRNA   20 mcg at 19 1013    morphine (PF) injection    PRN Trent Jeffery APRN - CRNA   0.2 mg at 19 1013    lactated ringers infusion    Continuous PRN RANI Avelar CRNA           Allergies:  No Known Allergies    Problem List:  There is no problem list on file for this patient.       Past Medical History:        Diagnosis Date    Anemia     Anxiety disorder     Depression  Past Surgical History:        Procedure Laterality Date     SECTION N/A 10/6/2017     SECTION performed by Jesi Reece DO at [address]       Social History:    Social History     Tobacco Use    Smoking status: Never Smoker    Smokeless tobacco: Never Used   Substance Use Topics    Alcohol use: No                                Counseling given: Not Answered      Vital Signs (Current):   Vitals:    19 0722 19 0727 19 0756   BP: 103/61     Pulse: 90     Resp: 18     Temp: 96.4 °F (35.8 °C)     TempSrc: Oral     SpO2: 95% 96%    Weight:   170 lb (77.1 kg)   Height:   5' 1\" (1.549 m)                                              BP Readings from Last 3 Encounters:   19 103/61   19 (!) 101/57   10/08/17 117/71       NPO Status: Time of last liquid consumption:                         Time of last solid consumption:                         Date of last liquid consumption: 19                        Date of last solid food consumption: 19    BMI:   Wt Readings from Last 3 Encounters:   19 170 lb (77.1 kg)   10/04/17 181 lb (82.1 kg)     Body mass index is 32.12 kg/m². CBC:   Lab Results   Component Value Date    WBC 6.4 2019    RBC 3.68 2019    HGB 10.9 2019    HCT 32.3 2019    MCV 87.8 2019    RDW 15.5 10/08/2017     2019       CMP: No results found for: NA, K, CL, CO2, BUN, CREATININE, GFRAA, AGRATIO, LABGLOM, GLUCOSE, PROT, CALCIUM, BILITOT, ALKPHOS, AST, ALT    POC Tests: No results for input(s): POCGLU, POCNA, POCK, POCCL, POCBUN, POCHEMO, POCHCT in the last 72 hours.     Coags: No results found for: PROTIME, INR, APTT    HCG (If Applicable): No results found for: PREGTESTUR, PREGSERUM, HCG, HCGQUANT     ABGs: No results found for: PHART, PO2ART, XTF7POI, BSB9CDX, BEART, K8OEWNIR     Type & Screen (If Applicable):  Lab Results   Component Value Date    [facility] 2019 Anesthesia Evaluation   no history of anesthetic complications:   Airway: Mallampati: II  TM distance: >3 FB   Neck ROM: full  Mouth opening: > = 3 FB Dental:          Pulmonary:Negative Pulmonary ROS and normal exam              Patient did not smoke on day of surgery. Cardiovascular:  Exercise tolerance: good (>4 METS),                     Neuro/Psych:   (+) psychiatric history:            GI/Hepatic/Renal: Neg GI/Hepatic/Renal ROS            Endo/Other: Negative Endo/Other ROS             Pt had no PAT visit       Abdominal:           Vascular: negative vascular ROS. Anesthesia Plan      spinal     ASA 2           MIPS: Postoperative opioids intended and Prophylactic antiemetics administered. Anesthetic plan and risks discussed with patient. Plan discussed with CRNA.                   Mauro Loo MD   5/1/2019

## 2019-05-01 NOTE — H&P
Women's Health for [facility].  History and Physical    Patient Name: Yanick Buam   Patient ID: [id number]   Sex: Female   YOB: 1990         Create Date: 2019                   Chief Complaint      pregnancy with previous csxn           History Of Present Illness   This is a 34year old /White female, , who is at 44 weeks gestation with an CHRISTOPHER of 2019 presenting for Repeat  Section. Patient has a medical history significant for Anemia affecting pregnancy in third trimester, Anxiety / Depression, GERD (gastroesophageal reflux disease), and Thrombocytopenia affecting pregnancy. Her pregnancy has been complicated by Anemia and thrombocytopenia   Patient admits fetal movements and contractions and denies leaking of fluid.          Past Medical History   Disease Name Date Onset Notes   Anemia affecting pregnancy in third trimester 2019 --    Anxiety / Depression --  --    Chlamydia infection affecting pregnancy 09/10/18 re-test 18   GERD (gastroesophageal reflux disease) 2019 --    Known or suspected fetal anomaly, antepartum 2018 cardiac focus   Thrombocytopenia affecting pregnancy --  --            Past Surgical History   Procedure Name Date Notes    Section, Primary 10/06/2017 [facility]           Medication List   Name Date Started Instructions   ferrous sulfate 325 mg (65 mg iron) oral tablet 2019 take 1 tablet by oral route 2 times a day for 30 days   omeprazole 20 mg oral capsule,delayed release(DR/EC) 2019 take 1 capsule (20 mg) by oral route once daily before a meal for 30 days   Prenatal Vitamin 27 mg iron- 0.8 mg oral tablet 2018 take 1 tablet by oral route once daily for 30 days           Allergy List   Allergen Name Date Reaction Notes   Bee Stings --  --  09/10/2018 -    NO KNOWN DRUG ALLERGIES --  --  09/10/2018 -    No Known Food Allergies --  --  09/10/2018 -            Family Medical History   Disease Name Relative/Age Notes   Family history of melanoma Grandmother (maternal)/   --    Family history of heart attack Grandfather (paternal)/   --            Reproductive History   Menstrual   Age Menarche: 17 Cycle Interval(Days): 28 Menses Duration(Days): 4   Flow: Heavy Last Menstrual Period:  Certainty of LMP Date: 100%   Home Pregnancy Test: Positve Menopause Status: Premenopausal Method of Birth Control: None   Clots?: No Breakthrough Bleeding?: No HRT?: No   Pregnancy Summary   Total Pregnancies: 2 Full Term: 1 Premature: 0   Ab Induced: 0 Ab Spontaneous: 0 Ectopics: 0   Multiples: 0 Livin   Pregnancy Details    Date GA Hrs Labor Birth Wt Sex Type Delivery Anes? Early Labor? Comments/ Complications Location   10/06/2017 41 31 7lb 3oz Female C-Sect. Spinal  failure to progress[facility]           Social History   Finding Status Start/Stop Quantity Notes   Age of first sexual encounter --  --/-- --  14   Alcohol Former --/-- --  2018 - Prior to pregnancy   College, 4-year --  --/-- --  2018   Denies emotional/verbal abuse --  --/-- --  09/10/2018   Denies knowledge of exposure to hazardous substances --  --/-- --  09/10/2018 -    Denies physical abuse --  --/-- --  09/10/2018   Denies Sexual Abuse --  --/-- --  09/10/2018   Did not serve --  --/-- --  09/10/2018 -    History of Chicken Pox --  --/-- --  09/10/2018 -    IV drug abuse Never --/-- --  2018 -    Lifetime partners --  --/-- --  10   Marijuana Never --/-- --  2018 -     --  --/-- --  09/10/2018   Moderate Amount of Exercise (1-3 times weekly) --  --/-- --  09/10/2018 -    No Family Physician --  --/-- --  09/10/2018 -    Other --  --/-- --  09/10/2018 -  for [employer] ([employer] travels for work, works from home part of the time.     Partners in the last 6 months --  --/-- --  1 09/10/2018   Tobacco Never --/-- --  09/10/2018 -    Uses seat belts --  --/-- --  09/10/2018 -            Immunizations   NameDate Admin Mfg Trade Name Lot Number Route Inj VIS Given VIS Publication   InfluenzaRefused 09/10/2018 NE Not Entered  NE NE     Comments:    Tdap02/08/2019 SKB BOOSTRIX [id number] IM LD 02/08/2019    Comments:            Review of Systems   ConstitutionalDenies : fever, body aches, weight loss, additional symptoms except as noted in the HPI   EyesDenies : impaired vision, additional symptoms except as noted in the HPI   HENTDenies : headaches, sinus congestion, neck stiffness, sore throat, decreased hearing, additional symptoms except as noted in the HPI   BreastsDenies : nipple discharge, additional symptoms except as noted in the HPI   CardiovascularDenies : chest pain, irregular heart beats, syncope, lower extremity edema, palpitations, additional symptoms except as noted in the HPI   RespiratoryDenies : shortness of breath, wheezing, cough, additional symptoms except as noted in the HPI   GastrointestinalDenies : nausea, vomiting, diarrhea, constipation, reflux, abdominal pain, blood in stools, additional symptoms except as noted in the HPI   GenitourinaryDenies : urgency, frequency, dysuria, incontinence, additional symptoms except as noted in the HPI   IntegumentDenies : rash, changes to existing skin lesions or moles, additional symptoms except as noted in the HPI   NeurologicDenies : muscular weakness, incoordination, tingling or numbness, headache, additional symptoms except as noted in the HPI   MusculoskeletalDenies : joint pain, muscle pain, additional symptoms except as noted in the HPI   EndocrineDenies : cold intolerance, heat intolerance, additional symptoms except as noted in the HPI   PsychiatricDenies : addtional symptoms except as noted in the HPI   Heme-LymphDenies : easy bruising, lymph node enlargement or tenderness, addtional symptoms except as noted in the HPI   Allergic-ImmunologicDenies : frequent illnesses, addtional symptoms except as noted in the HPI Vitals   Date Time BP Position Site L\R Cuff Size HR RR TEMP (F) WT  HT  BMI kg/m2 BSA m2 O2 Sat HC       04/24/2019 02:08 /66 Sitting       170lbs 0oz 5'  2\" 31.09 1.84             Physical Examination   ConstitutionalAppearance : well-nourished, well developed, alert, in no acute distress   EyesConjunctiva/Eyelids : conjunctiva normal, eyelid appearance normal   Sclera : sclera white   HENTHead and Face :   Head : normocephalic, atraumatic   Ears :   External Ears : external ears within normal limits   Hearing : hearing intact bilaterally   Nose :   External Nose : external nose normal in appearance, nares patent, nasal discharge absent   Mouth :   General : appearance normal   Lips : lip appearance normal   NeckInspection/Palpation : normal appearance, no masses or tenderness   Lymph Nodes : no lymphadenopathy present   Range of Motion : neck supple with full range of motion   Thyroid : gland size normal, nontender, no nodules or masses present on palpation   ChestRespiratory Effort : breathing unlabored   Auscultation : normal breath sounds, no rales, no rhonchi   CardiovascularHeart :    Auscultation : regular rate, normal rhythm, no murmurs present   BreastsInspection of Breasts : Breasts symmetrical, no skin changes, no discharge present   Palpation of Breasts and Axillae : No masses present on palpation, no breast tenderness   Axillary Lymph Nodes : no lymphadenopathy present   GastrointestinalAbdominal Examination : abdomen nontender to palpation, normal bowel sounds, tone normal without rigidity or guarding, no masses present, umbilicus without lesions   Liver and spleen : no hepatomegaly present, liver nontender to palpation   Hernias : no hernias present   GenitourinaryExternal Genitalia : normal appearance for age, no discharge present, no tenderness present, no inflammatory lesions present   Vagina : normal vaginal vault without central or paravaginal defects, no discharge present, no inflammatory lesions present, no masses present   Bladder : nontender to palpation   Urethra :   Urethral Body : urethra palpation normal, urethra structural support normal   Urethral Meatus : no erythema or lesions present   Cervix : appearance healthy, no lesions present, nontender to palpation, contact bleeding present   Uterus : nontender to palpation, no masses present, position midline/midplane, mobility: normal   Adnexa : no adnexal tenderness present, no adnexal masses present   Perineum : perineum within normal limits, no evidence of trauma, no rashes or skin lesions present   Anus : anus within normal limits, no hemorrhoids present   Inguinal Lymph Nodes : no lymphadenopathy present   LymphaticLymph Nodes : no other lymphadenopathy present   SkinGeneral Inspection : no rashes present, no lesions present, no areas of discoloration   Body Hair : general body hair distribution normal   Pubic Hair : normal pubic hair distribution for age   Genitalia and Groin : no rashes present, no lesions present, no areas of discoloration, no masses present   Neurologic/PsychiatricMental Status :   Orientation : grossly oriented to person, place and time   Mood and Affect : mood normal, affect appropriate   Cranial Nerves : cranial nerves intact bilaterally           Assessment   39 weeks gestation of pregnancy     V22.2/Z3A.39    Previous  delivery affecting pregnancy     654.20/O34.219    Anemia affecting pregnancy in third trimester       Anemia complicating pregnancy, third trimester     648.23/O99.013    Thrombocytopenia affecting pregnancy       Other diseases of the blood and blood-forming organs and certain disorders involving the immune mechanism complicating pregnancy, unspecified trimester     649.30/O99.119  Thrombocytopenia, unspecified     649.30/D69.6    GERD (gastroesophageal reflux disease)     530.81/K21.9      Plan   Instructionsdiscussed risks and benefits of  vs rltcs and pt desires rltcs.

## 2019-05-01 NOTE — FLOWSHEET NOTE
Ready prep wipes applied to abd/pubic region. Clippers not needed. Pt clipped abd/pubic region prior to admission.

## 2019-05-01 NOTE — PLAN OF CARE
Problem: Anxiety:  Goal: Level of anxiety will decrease  Description  Level of anxiety will decrease  Outcome: Ongoing  Note:   Calm and cooperative. Plan of care and procedures explained. Problem: Aspiration - Risk of:  Goal: Absence of aspiration  Description  Absence of aspiration  Outcome: Ongoing  Note:   Lungs clear bilat. Respirations easy and unlabored. Vital signs stable. Problem: Tissue Perfusion - Uteroplacental, Altered:  Goal: Absence of abnormal fetal heart rate pattern  Description  Absence of abnormal fetal heart rate pattern  Outcome: Ongoing  Note:   Baby active. FHTs reactive. Continuous fetal monitoring. Problem: Venous Thromboembolism - Risk of:  Goal: Will show no signs or symptoms of venous thromboembolism  Description  Will show no signs or symptoms of venous thromboembolism  Outcome: Ongoing  Note:   Homans negative. SCDs in place. Problem: Falls - Risk of:  Goal: Will remain free from falls  Description  Will remain free from falls  Outcome: Ongoing  Note:   Side rails up x2 and call light within reach.  and family at bedside. BRP with assist.     Problem: Discharge Planning:  Goal: Discharged to appropriate level of care  Description  Discharged to appropriate level of care  Outcome: Ongoing  Note:   Sha Choudhury is aware she will remain on labor/delivery unit for 2 hrs post surgery bonding with baby and then transfer to mom/baby unit for continued care til discharge. Care plan reviewed with patient and  . Patient and  verbalize understanding of the plan of care and contribute to goal setting.

## 2019-05-01 NOTE — PLAN OF CARE
Problem: Discharge Planning:  Goal: Discharged to appropriate level of care  Description  Discharged to appropriate level of care  Outcome: Ongoing  Note:   Remains in hospital, discussed possible discharge needs. Problem: Fluid Volume - Imbalance:  Goal: Absence of postpartum hemorrhage signs and symptoms  Description  Absence of postpartum hemorrhage signs and symptoms  Outcome: Ongoing  Note:   Moderate amount of lochia       Problem: Fluid Volume - Imbalance:  Goal: Absence of imbalanced fluid volume signs and symptoms  Description  Absence of imbalanced fluid volume signs and symptoms  Outcome: Ongoing  Note:   moderate amount of lochia       Problem: Infection - Surgical Site:  Goal: Will show no infection signs and symptoms  Description  Will show no infection signs and symptoms  Outcome: Ongoing  Note:   No signs of infection      Problem: Mood - Altered:  Goal: Mood stable  Description  Mood stable  Outcome: Ongoing  Note:   Calm and cooperative      Problem: Nausea/Vomiting:  Goal: Absence of nausea/vomiting  Description  Absence of nausea/vomiting  Outcome: Ongoing  Note:   Nausea improved with zofran     Problem: Pain - Acute:  Goal: Pain level will decrease  Description  Pain level will decrease  Outcome: Ongoing  Note:   Pain managed with medication   Care plan reviewed with patient and she contributes to goal setting and voices understanding of plan of care.

## 2019-05-01 NOTE — L&D DELIVERY NOTE
PREOPERATIVE DIAGNOSES:     1.  39w3d week intrauterine pregnancy. 2.  prev csxn  3. Anemia  4. Thrombocytopenia  5. GERD      POSTOPERATIVE DIAGNOSES:     Same.      PROCEDURE:  repeat low transverse  section.      SURGEON:  Dian Johnson       ESTIMATED BLOOD LOSS:  580 mL.      COMPLICATIONS:  None.         ANESTHESIA:  spinal with duramorph.       INDICATIONS:  27yo  @ 39+3wks who presents for a rltcs. Risks and benefits discussed.       FINDINGS:    Information for the patient's :  Sebastien Lashaun Girl Eunice Nassar [[id number]]      , normal uterus, tubes and ovaries bilaterally.        DETAILS OF PROCEDURE:  The patient was taken to the operating room where  spinal anesthesia was performed.  She was then placed in the dorsal supine  position with a leftward lateral tilt and prepped and draped in the normal  sterile fashion.  A Pfannenstiel skin incision was made with the scalpel  and carried down to the underlying fascia with electrocautery which was incised at the midline and extended laterally with the curved izquierdo scissors.  The rectus muscles were  dissected off the fascia with cautery and  in the midline. The peritoneum was  entered bluntly.  The peritoneal opening was then extended superiorly and inferiorly with good visualization of the bladder.  An Jesus-O self-retaining retractor was placed without difficulty. The vesicouterine peritoneum was identified and entered sharply with the [facility] scissors. The incision was extended laterally and a bladder flap was created digitally. The lower uterine segment was incised with a scalpel and the incision was extended laterally.   Amniotomy was performed with clear fluid noted.  The infant was then delivered in vertex presentation atraumatically.  The cord was clamped and cut and the infant was bulb  suctioned and handed off to awaiting pediatric staff.  Cord blood was collected.  The placenta was manually extracted.  The uterus was wiped clean of debris.  The uterine incision was closed in a single layer of 0 Vicryl in a running locked fashion.  A second stitch of the same suture was used in an imbricating fashion to obtain hemostasis. The gutters were cleared of clots and debris. The adnexa were palpated and found to be normal. The instruments were removed from the patients abdomen and the peritoneum was closed with a running stitch of 2-0 vicryl.  The fascia was then closed with 0 Vicryl in a running continuous fashion.  The subcutaneous fat was irrigated, made hemostatic and  reapproximated with 3-0 plain gut. The skin was closed with 4-0 vicryl in a subcutaneous fashion. Mastisol and steristrips were placed.   Hemostasis was noted on all levels of the abdominal wall.  All  sponge, lap and instrument counts were correct x2.  The patient tolerated procedure well and was taken to the recovery room in stable condition with her infant.                               Mother's Information    Labor Events     labor?:  No     Mother Delivery Information    Episiotomy:  None  Lacerations:  None  Repair Suture:  None  Surgical or Additional Est. Blood Loss (mL):  700 (View Only):  Edit in Flowsheets   Combined Est. Blood Loss (mL):  700        [place], Baby Girl Connie Solis [[id number]]    Labor Events     labor?:  No   steroids?:  None  Cervical ripening date/time:     Antibiotics received during labor?:  Yes  Rupture date/time:             Anesthesia    Method:  Spinal     Assisted Delivery Details    Forceps attempted?:  No  Vacuum extractor attempted?:  No     Document Additional Attempt       Document Additional Attempt             Shoulder Dystocia    Shoulder dystocia present?:  No  Add Second Maneuver  Add Third Maneuver  Add Fourth Maneuver  Add Fifth Maneuver  Add Sixth Maneuver  Add Seventh Maneuver  Add Eighth Maneuver  Add Ninth Maneuver     Cordell Presentation    Presentation:  Vertex      Information    Head delivery Procedures:  None

## 2019-05-01 NOTE — FLOWSHEET NOTE
Dr García in room. Update given. Plan of care discussed. Questions encouraged and addressed.  and mom in room.

## 2019-05-02 PROCEDURE — 2709999900 HC NON-CHARGEABLE SUPPLY

## 2019-05-02 PROCEDURE — 6360000002 HC RX W HCPCS: Performed by: ANESTHESIOLOGY

## 2019-05-02 PROCEDURE — 6370000000 HC RX 637 (ALT 250 FOR IP): Performed by: OBSTETRICS & GYNECOLOGY

## 2019-05-02 PROCEDURE — 2580000003 HC RX 258: Performed by: OBSTETRICS & GYNECOLOGY

## 2019-05-02 PROCEDURE — 1220000000 HC SEMI PRIVATE OB R&B

## 2019-05-02 PROCEDURE — 6370000000 HC RX 637 (ALT 250 FOR IP): Performed by: ANESTHESIOLOGY

## 2019-05-02 RX ADMIN — DOCUSATE SODIUM 100 MG: 100 CAPSULE, LIQUID FILLED ORAL at 20:38

## 2019-05-02 RX ADMIN — OXYCODONE HYDROCHLORIDE 5 MG: 5 TABLET ORAL at 05:00

## 2019-05-02 RX ADMIN — HYDROCODONE BITARTRATE AND ACETAMINOPHEN 2 TABLET: 5; 325 TABLET ORAL at 18:50

## 2019-05-02 RX ADMIN — KETOROLAC TROMETHAMINE 15 MG: 30 INJECTION, SOLUTION INTRAMUSCULAR at 00:05

## 2019-05-02 RX ADMIN — HYDROCODONE BITARTRATE AND ACETAMINOPHEN 1 TABLET: 5; 325 TABLET ORAL at 13:08

## 2019-05-02 RX ADMIN — DOCUSATE SODIUM 100 MG: 100 CAPSULE, LIQUID FILLED ORAL at 07:58

## 2019-05-02 RX ADMIN — SODIUM CHLORIDE, POTASSIUM CHLORIDE, SODIUM LACTATE AND CALCIUM CHLORIDE: 600; 310; 30; 20 INJECTION, SOLUTION INTRAVENOUS at 06:56

## 2019-05-02 RX ADMIN — KETOROLAC TROMETHAMINE 15 MG: 30 INJECTION, SOLUTION INTRAMUSCULAR at 07:57

## 2019-05-02 RX ADMIN — IBUPROFEN 800 MG: 800 TABLET ORAL at 15:42

## 2019-05-02 ASSESSMENT — PAIN SCALES - GENERAL
PAINLEVEL_OUTOF10: 3
PAINLEVEL_OUTOF10: 4
PAINLEVEL_OUTOF10: 4
PAINLEVEL_OUTOF10: 0
PAINLEVEL_OUTOF10: 7
PAINLEVEL_OUTOF10: 2
PAINLEVEL_OUTOF10: 4
PAINLEVEL_OUTOF10: 0

## 2019-05-02 NOTE — LACTATION NOTE
Rounding to review breastfeeding progress. Patient tearful, stated she is concerned about breastfeeding. She pumped and gave EBM per bottle and wants to direct breastfeed this baby. Reviewed milk production process,  sleep and feeding patterns, frequency, duration, positioning, latch, and satiety cues. She reports bilateral nipple tenderness, and both are red. Assisted with clutch position at right breast and easily obtained a wide deep latch with productive suck/swallow rhythm and occasional audible swallow with use of breast compression. Patient reported initial nipple pain that quickly improved. Observed 20 mins of active feeding and feeding continued. Patient denied additional questions or needs. LC to follow up as requested.   Iris QUINN, [employer]

## 2019-05-02 NOTE — PLAN OF CARE
Problem: Discharge Planning:  Goal: Discharged to appropriate level of care  Description  Discharged to appropriate level of care  5/2/2019 1043 by Ketty Baig RN  Outcome: Ongoing  Note:   Remains in hospital, discussed possible discharge needs. Problem: Fluid Volume - Imbalance:  Goal: Absence of postpartum hemorrhage signs and symptoms  Description  Absence of postpartum hemorrhage signs and symptoms  5/2/2019 1043 by Ketty Baig RN  Outcome: Ongoing  Note:   Small amount of lochia       Problem: Infection - Surgical Site:  Goal: Will show no infection signs and symptoms  Description  Will show no infection signs and symptoms  5/2/2019 1043 by Ketty Baig RN  Outcome: Ongoing  Note:   No signs of infection       Problem: Mood - Altered:  Goal: Mood stable  Description  Mood stable  5/2/2019 1043 by Ketty Baig RN  Outcome: Ongoing  Note:   Calm and cooperative       Problem: Nausea/Vomiting:  Goal: Absence of nausea/vomiting  Description  Absence of nausea/vomiting  5/2/2019 1043 by Ketty Baig RN  Outcome: Ongoing  Note:   Tolerating regular diet     Problem: Pain - Acute:  Goal: Pain level will decrease  Description  Pain level will decrease  5/2/2019 1043 by Ketty Baig RN  Outcome: Ongoing  Note:   Pain managed with medications and abdominal binder     Problem: Pain:  Goal: Pain level will decrease  Description  Pain level will decrease  5/2/2019 1043 by Ketty Baig RN  Outcome: Ongoing  Note:   Pain managed with medications and abdominal binder     Problem: Pain:  Goal: Control of acute pain  Description  Control of acute pain  5/2/2019 1043 by Ketty Baig RN  Outcome: Ongoing  Note:   Pain managed with medications and abdominal binder   Care plan reviewed with patient and she contributes to goal setting and voices understanding of plan of care.

## 2019-05-02 NOTE — ANESTHESIA POSTPROCEDURE EVALUATION
Department of Anesthesiology  Postprocedure Note    Patient: Kristyn Anderson  MRN: [id number]  YOB: 1990  Date of evaluation: 2019  Time:  10:36 AM     Procedure Summary     Date:  19 Room / Location:  [facility] L&D OR [address] [place] [address] L&D OR    Anesthesia Start:  1008 Anesthesia Stop:  8514    Procedure:   SECTION (N/A Uterus) Diagnosis:  (PREGNANCY)    Surgeon:  Nell Fothergill, DO Responsible Provider:  Minerva Diaz MD    Anesthesia Type:  spinal ASA Status:  2          Anesthesia Type: No value filed. Fred Phase I: Fred Score: 10    Fred Phase II: Fred Score: 10    Last vitals: Reviewed and per EMR flowsheets.        Anesthesia Post Evaluation    Patient location during evaluation: floor  Patient participation: complete - patient participated  Level of consciousness: awake and alert  Pain score: 0  Airway patency: patent  Nausea & Vomiting: no nausea and no vomiting  Complications: no  Cardiovascular status: hemodynamically stable  Respiratory status: room air and spontaneous ventilation  Hydration status: euvolemic

## 2019-05-02 NOTE — FLOWSHEET NOTE
Pt ambulated to restroom with assistance. Brooks catheter discontinued; tip intact, small amount of lochia. No clots. Janet care done per pt. Pt ambulated back to bed; tolerated well. Denies dizziness and/or lightheadedness. Pt instructed to call when ready to void x2; verbalized understanding.

## 2019-05-02 NOTE — PLAN OF CARE
Problem: Discharge Planning:  Goal: Discharged to appropriate level of care  Description  Discharged to appropriate level of care  5/1/2019 2333 by Allison Eckert RN  Outcome: Ongoing  Note:   Remains in hospital, discussed possible discharge needs. Problem: Fluid Volume - Imbalance:  Goal: Absence of postpartum hemorrhage signs and symptoms  Description  Absence of postpartum hemorrhage signs and symptoms  5/1/2019 2333 by Allison Eckert RN  Outcome: Ongoing  Note:   Vaginal bleeding WNL, no clots or foul odors. Problem: Infection - Surgical Site:  Goal: Will show no infection signs and symptoms  Description  Will show no infection signs and symptoms  5/1/2019 2333 by Allison Eckert RN  Outcome: Ongoing  Note:   Vital signs and assessments WNL. Problem: Mood - Altered:  Goal: Mood stable  Description  Mood stable  5/1/2019 2333 by Allison Eckert RN  Outcome: Ongoing  Note:   Bonding with infant, participating in infant care. Problem: Nausea/Vomiting:  Goal: Absence of nausea/vomiting  Description  Absence of nausea/vomiting  5/1/2019 2333 by Allison Eckert RN  Outcome: Ongoing  Note:   No complaints of nausea/vomiting; will continue to monitor. Problem: Pain - Acute:  Goal: Pain level will decrease  Description  Pain level will decrease  5/1/2019 2333 by Allison Eckert RN  Outcome: Ongoing  Note:   Pain controlled with po meds. Discussed ice for perineal pain and/or incisional pain or the use of warm blanket/heating pad for uterine cramps. Pt states her pain goal 4/10 has been met. Problem: Pain:  Goal: Pain level will decrease  Description  Pain level will decrease  5/1/2019 2333 by Allison Eckert RN  Outcome: Ongoing  Note:   Pain controlled with po meds. Discussed ice for perineal pain and/or incisional pain or the use of warm blanket/heating pad for uterine cramps. Pt states her pain goal 4/10 has been met.        Problem: Pain:  Goal: Control of

## 2019-05-03 PROCEDURE — 6370000000 HC RX 637 (ALT 250 FOR IP): Performed by: OBSTETRICS & GYNECOLOGY

## 2019-05-03 PROCEDURE — 1220000000 HC SEMI PRIVATE OB R&B

## 2019-05-03 RX ORDER — HYDROCODONE BITARTRATE AND ACETAMINOPHEN 5; 325 MG/1; MG/1
1 TABLET ORAL EVERY 6 HOURS PRN
Qty: 20 TABLET | Refills: 0 | Status: SHIPPED | OUTPATIENT
Start: 2019-05-03 | End: 2019-05-03

## 2019-05-03 RX ORDER — HYDROCODONE BITARTRATE AND ACETAMINOPHEN 5; 325 MG/1; MG/1
1 TABLET ORAL EVERY 6 HOURS PRN
Qty: 20 TABLET | Refills: 0 | Status: SHIPPED | OUTPATIENT
Start: 2019-05-03 | End: 2019-05-10

## 2019-05-03 RX ADMIN — HYDROCODONE BITARTRATE AND ACETAMINOPHEN 1 TABLET: 5; 325 TABLET ORAL at 06:08

## 2019-05-03 RX ADMIN — IBUPROFEN 800 MG: 800 TABLET ORAL at 00:12

## 2019-05-03 RX ADMIN — IBUPROFEN 800 MG: 800 TABLET ORAL at 17:33

## 2019-05-03 RX ADMIN — IBUPROFEN 800 MG: 800 TABLET ORAL at 08:42

## 2019-05-03 RX ADMIN — HYDROCODONE BITARTRATE AND ACETAMINOPHEN 1 TABLET: 5; 325 TABLET ORAL at 20:35

## 2019-05-03 RX ADMIN — HYDROCODONE BITARTRATE AND ACETAMINOPHEN 1 TABLET: 5; 325 TABLET ORAL at 11:59

## 2019-05-03 RX ADMIN — HYDROCODONE BITARTRATE AND ACETAMINOPHEN 1 TABLET: 5; 325 TABLET ORAL at 16:18

## 2019-05-03 RX ADMIN — HYDROCODONE BITARTRATE AND ACETAMINOPHEN 2 TABLET: 5; 325 TABLET ORAL at 00:12

## 2019-05-03 RX ADMIN — DOCUSATE SODIUM 100 MG: 100 CAPSULE, LIQUID FILLED ORAL at 20:35

## 2019-05-03 RX ADMIN — DOCUSATE SODIUM 100 MG: 100 CAPSULE, LIQUID FILLED ORAL at 08:42

## 2019-05-03 ASSESSMENT — PAIN SCALES - GENERAL
PAINLEVEL_OUTOF10: 3
PAINLEVEL_OUTOF10: 4
PAINLEVEL_OUTOF10: 2
PAINLEVEL_OUTOF10: 7
PAINLEVEL_OUTOF10: 4

## 2019-05-03 NOTE — LACTATION NOTE
Spoke with primary RN regarding infant feeding; patient has decided to formula feed her baby, no direct breastfeeding or pumping.   Jesus QUINN, [employer] [facility] [employer]

## 2019-05-03 NOTE — PLAN OF CARE
Problem: Discharge Planning:  Goal: Discharged to appropriate level of care  Description  Discharged to appropriate level of care  5/3/2019 1337 by Hang Bassett RN  Outcome: Ongoing  Note:   No discharge needs voiced from patient at this time. Patient expected to be discharged home with family. Problem: Fluid Volume - Imbalance:  Goal: Absence of postpartum hemorrhage signs and symptoms  Description  Absence of postpartum hemorrhage signs and symptoms  5/3/2019 1337 by Hang Bassett RN  Outcome: Ongoing  Note:   Pt having scant amount of lochia, no clots     Problem: Infection - Surgical Site:  Goal: Will show no infection signs and symptoms  Description  Will show no infection signs and symptoms  5/3/2019 1337 by Hang Bassett RN  Outcome: Ongoing  Note:   Pt afebrile, no s/s of infection     Problem: Mood - Altered:  Goal: Mood stable  Description  Mood stable  5/3/2019 1337 by Hang Bassett RN  Outcome: Ongoing  Note:   Pt feeling 'overwhelmed' with breastfeeding and thinking about her other 20 month old child at home and how busy she will be, very tearful. Pt expressed concerns and RN talked with pt for awhile and later pt states she feels better     Problem: Pain - Acute:  Goal: Pain level will decrease  Description  Pain level will decrease  5/3/2019 1337 by Hang Bassett RN  Outcome: Ongoing  Note:   Patient reports incisional pain and is being controlled with motrin and norco prn. Pain goal is a 5. Pain goal is met       Problem: Pain:  Goal: Pain level will decrease  Description  Pain level will decrease  5/3/2019 1337 by Hang Bassett RN  Outcome: Ongoing  Note:   Patient reports incisional pain and is being controlled with motrin and norco prn. Pain goal is a 5. Pain goal is met     Care plan reviewed with patient. Patient  verbalizes understanding of the plan of care and contribute to goal setting.

## 2019-05-03 NOTE — DISCHARGE SUMMARY
Obstetric Discharge Summary      Pt Name: Josselin Lopes  MRN: [id number] [place] #: [de-identified]  YOB: 1990        Admitting Diagnosis  IUP  OB History        2    Para   1    Term   1       0    AB   0    Living   1       SAB   0    TAB   0    Ectopic   0    Molar   0    Multiple   0    Live Births   1                Reasons for Admission on 2019  7:20 AM  Delivery by  section of full-term infant [O82]        Postpartum/Operative Complications        Data  Information for the patient's :  Jony Haqriricki Girl Deatra Sera [[id number]]   female  Birth Weight: 7 lb 8.3 oz (3.41 kg)      Discharge Diagnosis  Postpartum, C/S Delivery    Discharge Information  Current Discharge Medication List      START taking these medications    Details   HYDROcodone-acetaminophen (NORCO) 5-325 MG per tablet Take 1 tablet by mouth every 6 hours as needed for Pain (acute post op pain) for up to 7 days.   Qty: 20 tablet, Refills: 0    Associated Diagnoses: Acute post-operative pain; Delivery by  section of full-term infant         CONTINUE these medications which have NOT CHANGED    Details   Prenatal MV-Min-Fe Fum-FA-DHA (PRENATAL 1 PO) Take 1 tablet by mouth daily         STOP taking these medications       ibuprofen (ADVIL;MOTRIN) 600 MG tablet Comments:   Reason for Stopping:                   C/S Delivery  Diet regular    Condition: Stable  Discharge to:  home  Follow up in 1 weeks    Patient to be discharged on 19    Electronically signed by RANI Grover CNP on 5/3/2019 at 10:47 AM

## 2019-05-03 NOTE — PLAN OF CARE
Plan of care discussed with mother and she contributes to goal setting and voices understanding of plan of care.

## 2019-05-03 NOTE — PROGRESS NOTES
Department of Obstetrics and Gynecology  Labor and Delivery  Post Partum Day #2      SUBJECTIVE:  Patient feeling well with no complaints. Breastfeeding/pumping and bottlefeeding without difficulty. Urination without difficulty. Mild lochia. Patient denies pain. Positive flatus and no bowel movement. OBJECTIVE:/64   Pulse 86   Temp 98 °F (36.7 °C) (Oral)   Resp 16   Ht 5' 1\" (1.549 m)   Wt 170 lb (77.1 kg)   SpO2 98%   BMI 32.12 kg/m²    ABDOMEN:  Soft, non-tender, fundus firm. GENITAL/URINARY:  Normal post partum findings. Extremities: warm and dry. No edema. DATA:    Lab Results   Component Value Date    HGB 10.9 05/01/2019    HCT 32.3 05/01/2019       ASSESSMENT & PLAN:    Normal post partum day 2 exam. Plan as per orders.        Electronically signed by RANI Hurst CNP on 5/3/2019 at 10:37 AM

## 2019-05-03 NOTE — PLAN OF CARE
Problem: Discharge Planning:  Goal: Discharged to appropriate level of care  Description  Discharged to appropriate level of care  5/3/2019 0042 by Michelle Saavedra RN  Outcome: Ongoing  Note:   Discharge not anticipated this shift. Plan of care discussed. Problem: Fluid Volume - Imbalance:  Goal: Absence of postpartum hemorrhage signs and symptoms  Description  Absence of postpartum hemorrhage signs and symptoms  5/3/2019 0042 by Michelle Saavedra RN  Outcome: Ongoing  Note:   Small amount of lochia on pad, no clots noted       Problem: Infection - Surgical Site:  Goal: Will show no infection signs and symptoms  Description  Will show no infection signs and symptoms  5/3/2019 0042 by Michelle Saavedra RN  Outcome: Ongoing  Note:   Afebrile, no signs of infection noted. Surgical dressing still in place clean, dry, and intact     Problem: Mood - Altered:  Goal: Mood stable  Description  Mood stable  5/3/2019 0042 by Michelle Saavedra RN  Outcome: Ongoing  Note:   Calm and cooperative, bonding well with infant       Problem: Pain - Acute:  Goal: Pain level will decrease  Description  Pain level will decrease  5/3/2019 0042 by Michelle Saavedra RN  Outcome: Ongoing  Note:   Complains of some incision pain. PO motrin and norco given. Pt repositions self in bed. Pain goal 5 out of 10     Problem: Pain:  Goal: Pain level will decrease  Description  Pain level will decrease  5/3/2019 0042 by Michelle Saavedra RN  Outcome: Ongoing  Note:   Complains of some incision pain. PO motrin and norco given. Pt repositions self in bed. Pain goal 5 out of 10     Problem: Pain:  Goal: Control of acute pain  Description  Control of acute pain  5/3/2019 0042 by Michelle Saavedra RN  Outcome: Ongoing  Note:   Complains of some incision pain. PO motrin and norco given. Pt repositions self in bed. Pain goal 5 out of 10    Care plan reviewed with patient.   Patient verbalizes understanding of the plan of care and contributes to goal setting.

## 2019-05-04 VITALS
BODY MASS INDEX: 32.1 KG/M2 | DIASTOLIC BLOOD PRESSURE: 68 MMHG | TEMPERATURE: 98.1 F | WEIGHT: 170 LBS | RESPIRATION RATE: 16 BRPM | OXYGEN SATURATION: 98 % | HEIGHT: 61 IN | HEART RATE: 79 BPM | SYSTOLIC BLOOD PRESSURE: 113 MMHG

## 2019-05-04 PROCEDURE — 6370000000 HC RX 637 (ALT 250 FOR IP): Performed by: OBSTETRICS & GYNECOLOGY

## 2019-05-04 RX ADMIN — DOCUSATE SODIUM 100 MG: 100 CAPSULE, LIQUID FILLED ORAL at 07:21

## 2019-05-04 RX ADMIN — IBUPROFEN 800 MG: 800 TABLET ORAL at 02:58

## 2019-05-04 RX ADMIN — HYDROCODONE BITARTRATE AND ACETAMINOPHEN 2 TABLET: 5; 325 TABLET ORAL at 07:21

## 2019-05-04 RX ADMIN — HYDROCODONE BITARTRATE AND ACETAMINOPHEN 1 TABLET: 5; 325 TABLET ORAL at 00:37

## 2019-05-04 ASSESSMENT — PAIN SCALES - GENERAL: PAINLEVEL_OUTOF10: 7

## 2019-05-04 NOTE — PROGRESS NOTES
Postpartum education brochure given, teaching complete. [place] postpartum depression screening discussed with patient. Patient instructed [facility] 2 weeks and contact her healthcare provider if her score is > 10. Patient voiced understanding. Reviewed postpartum birth warning signs flyer with patient. Patient has voiced understanding of teaching. Mother's blood type is a positive. Mother did not receive Rhogam.      Infant has roomed in with mother this shift. Benefits of rooming in discussed.

## 2019-05-04 NOTE — PLAN OF CARE
Problem: Discharge Planning:  Goal: Discharged to appropriate level of care  Description  Discharged to appropriate level of care  5/4/2019 0102 by Pratima Mcdaniel RN  Outcome: Ongoing  Note:   Plan is for discharge home today     Problem: Fluid Volume - Imbalance:  Goal: Absence of postpartum hemorrhage signs and symptoms  Description  Absence of postpartum hemorrhage signs and symptoms  5/4/2019 0102 by Pratima Mcdaniel RN  Outcome: Ongoing  Note:   Scant amount of lochia     Problem: Infection - Surgical Site:  Goal: Will show no infection signs and symptoms  Description  Will show no infection signs and symptoms  5/4/2019 0102 by Pratima Mcdaniel RN  Outcome: Ongoing  Note:   Abdominal dressing dry and intact, temp WDL     Problem: Mood - Altered:  Goal: Mood stable  Description  Mood stable  5/4/2019 0102 by Pratima Mcdaniel RN  Outcome: Ongoing  Note:   Pleasant and cooperative     Problem: Pain - Acute:  Goal: Pain level will decrease  Description  Pain level will decrease  5/4/2019 0102 by Pratima Mcdaniel RN  Outcome: Ongoing  Note:   States relief with oral pain medication     Problem: Pain:  Goal: Pain level will decrease  Description  Pain level will decrease  5/4/2019 0102 by Pratima Mcdaniel RN  Outcome: Ongoing  Note:   States relief with oral pain medication     Problem: Pain:  Goal: Control of acute pain  Description  Control of acute pain  5/4/2019 0102 by Pratima Mcdaniel RN  Outcome: Ongoing  Note:   Using oral pain medication for relief   Care plan reviewed with patient and SO. Patient and SO verbalize understanding of the plan of care and contribute to goal setting.

## 2019-05-04 NOTE — PLAN OF CARE
Problem: Discharge Planning:  Goal: Discharged to appropriate level of care  Description  Discharged to appropriate level of care  5/4/2019 0954 by Sridhar Fox RN  Outcome: Met This Shift  Note:   Pt discharged home with infant and . No home needs verbalized     Care plan reviewed with patient. Patient verbalizes understanding of the plan of care and contribute to goal setting.

## 2021-04-01 ENCOUNTER — HOSPITAL ENCOUNTER (OUTPATIENT)
Age: 31
Discharge: HOME | End: 2021-04-01
Attending: OBSTETRICS & GYNECOLOGY | Admitting: OBSTETRICS & GYNECOLOGY
Payer: COMMERCIAL

## 2021-04-01 VITALS
SYSTOLIC BLOOD PRESSURE: 117 MMHG | RESPIRATION RATE: 16 BRPM | HEART RATE: 83 BPM | TEMPERATURE: 96.3 F | DIASTOLIC BLOOD PRESSURE: 77 MMHG

## 2021-04-01 PROCEDURE — 59025 FETAL NON-STRESS TEST: CPT

## 2021-04-02 NOTE — FLOWSHEET NOTE
Pitcher of ice water provided to pt. Pt felt one fetal movement just prior to drinking water. Encouaged to drink the pitcher of water in the next 15 minutes to see if helps with feeling more fetal movement.

## 2021-04-02 NOTE — FLOWSHEET NOTE
RN at bedside, reviewed orders to go home and patient reiterated comfort with going home. Reviewed signs and symptoms of labor and when to come back to L&D for evaluation such as concerns regarding decreased fetal movement, vaginal bleeding, loss of fluid or contractions and pain. Patient voices understanding, ambulated off unit in stable condition with no concerns.

## 2021-04-24 ENCOUNTER — HOSPITAL ENCOUNTER (OUTPATIENT)
Age: 31
Discharge: HOME | End: 2021-04-24
Attending: OBSTETRICS & GYNECOLOGY | Admitting: OBSTETRICS & GYNECOLOGY
Payer: COMMERCIAL

## 2021-04-24 VITALS
WEIGHT: 180 LBS | SYSTOLIC BLOOD PRESSURE: 123 MMHG | HEART RATE: 104 BPM | DIASTOLIC BLOOD PRESSURE: 77 MMHG | OXYGEN SATURATION: 96 % | TEMPERATURE: 97.3 F | RESPIRATION RATE: 18 BRPM | HEIGHT: 62 IN | BODY MASS INDEX: 33.13 KG/M2

## 2021-04-24 PROBLEM — O99.891 PREGNANCY COMPLICATION BEFORE BIRTH: Status: ACTIVE | Noted: 2021-04-24

## 2021-04-24 LAB
ALBUMIN SERPL-MCNC: 3.2 G/DL (ref 3.5–5.1)
ALP BLD-CCNC: 116 U/L (ref 38–126)
ALT SERPL-CCNC: 24 U/L (ref 11–66)
ANION GAP SERPL CALCULATED.3IONS-SCNC: 11 MEQ/L (ref 8–16)
AST SERPL-CCNC: 21 U/L (ref 5–40)
BILIRUB SERPL-MCNC: 0.2 MG/DL (ref 0.3–1.2)
BUN BLDV-MCNC: 6 MG/DL (ref 7–22)
CALCIUM SERPL-MCNC: 9.3 MG/DL (ref 8.5–10.5)
CHLORIDE BLD-SCNC: 104 MEQ/L (ref 98–111)
CO2: 19 MEQ/L (ref 23–33)
CREAT SERPL-MCNC: 0.5 MG/DL (ref 0.4–1.2)
GFR SERPL CREATININE-BSD FRML MDRD: > 90 ML/MIN/1.73M2
GLUCOSE BLD-MCNC: 88 MG/DL (ref 70–108)
POTASSIUM SERPL-SCNC: 3.6 MEQ/L (ref 3.5–5.2)
SODIUM BLD-SCNC: 134 MEQ/L (ref 135–145)
TOTAL PROTEIN: 6.2 G/DL (ref 6.1–8)

## 2021-04-24 PROCEDURE — 82239 BILE ACIDS TOTAL: CPT

## 2021-04-24 PROCEDURE — 80053 COMPREHEN METABOLIC PANEL: CPT

## 2021-04-24 PROCEDURE — 36415 COLL VENOUS BLD VENIPUNCTURE: CPT

## 2021-04-24 RX ORDER — SERTRALINE HYDROCHLORIDE 100 MG/1
100 TABLET, FILM COATED ORAL DAILY
COMMUNITY

## 2021-04-24 RX ORDER — ASPIRIN 81 MG/1
81 TABLET ORAL DAILY
COMMUNITY

## 2021-04-24 NOTE — FLOWSHEET NOTE
Discharge instructions given and reviewed with patient. Informed patient to notify physican or come back to L & D if leaking fluid from vagina with or without contractions. Bright red vaginal bleeding occurs that is as heavy as or heavier than a period. Regular contractions that are 2-5 minutes apart that are longer, stronger, and closer together. Decreased fetal movement. Elevated temperature >100.5°F and chills. Blurred vision, spots before eyes, unrelievable headache, severe facial swelling, or upper abdominal pain. Questions and concerns denied by pt and  Sherlyn Nile, papers signed. Advised pt to keep next scheduled appt with Dr. Jayne Zepeda and f/u with lab results with her office on Monday or Tuesday.

## 2021-04-24 NOTE — FLOWSHEET NOTE
Dr Jessica Vasquez notified of admission and pt complaints of intense itching of hand and feet and then whole body. Took benadryl and slept and woke up itching. Strip reactive. Order rec'd.

## 2021-04-24 NOTE — FLOWSHEET NOTE
Dr. Kayy Eldridge returned call to unit. Updated on lab results. Pt to f/u with Dr. Filomena Sanchez group at beginning of week for rest of lab results. Discharge orders received.

## 2021-04-24 NOTE — PLAN OF CARE
Problem: Healthcare acquired conditions:  Goal: Absence of healthcare acquired conditions  Description: Absence of healthcare acquired conditions  Outcome: Ongoing  Note: Pt stable     Problem: Discharge Planning:  Goal: Discharged to appropriate level of care  Description: Discharged to appropriate level of care  Outcome: Ongoing  Note: Planning discharge home pending labs. Care plan reviewed with patient and husb. Patient and husb verbalize understanding of the plan of care and contribute to goal setting.

## 2021-04-24 NOTE — FLOWSHEET NOTE
Gr3 P 2 repeat c/b admitted to labor and delivery at 38 wks gest. Complaining of intense itching of her hands and feet since Thursday. Took benadryl this AM fell asleep and work up itching again. States she is scheduled for c?b on Friday. States baby is moving well. EFM applied to soft non tender abdomen.

## 2021-04-27 LAB — MISC. #1 REFERENCE GROUP TEST: ABNORMAL

## 2021-04-28 NOTE — PROCEDURES
Department of Obstetrics and Gynecology  Labor and Delivery   Triage Note  Jacey Kirk D.O.      Pt Name: Gisela Lang  MRN: [id number] [place] #: [de-identified]  YOB: 1990  Date of Service 2021    SUBJECTIVE: This 33yo  patient presented at 29 week+5 complaining of decreased fetal movement. OBJECTIVE    Fetal heart rate:         Baseline Heart Rate:  130       Accelerations:  present       Decelerations:  none       Variability:  moderate    Contraction frequency: nil    The NST was reactive level 1      ASSESSMENT & PLAN:  Discharged to home in a stable condition and instructed to follow up at her next scheduled appointment. Lashanda VELAZQUEZ

## 2021-04-29 ENCOUNTER — HOSPITAL ENCOUNTER (INPATIENT)
Age: 31
LOS: 2 days | Discharge: HOME | End: 2021-05-01
Attending: OBSTETRICS & GYNECOLOGY | Admitting: OBSTETRICS & GYNECOLOGY
Payer: COMMERCIAL

## 2021-04-29 ENCOUNTER — ANESTHESIA (OUTPATIENT)
Dept: LABOR AND DELIVERY | Age: 31
End: 2021-04-29
Payer: COMMERCIAL

## 2021-04-29 ENCOUNTER — ANESTHESIA EVENT (OUTPATIENT)
Dept: LABOR AND DELIVERY | Age: 31
End: 2021-04-29
Payer: COMMERCIAL

## 2021-04-29 LAB
ABO CHECK: NORMAL
AMPHETAMINE+METHAMPHETAMINE URINE SCREEN: NEGATIVE
BARBITURATE QUANTITATIVE URINE: NEGATIVE
BENZODIAZEPINE QUANTITATIVE URINE: NEGATIVE
CANNABINOID QUANTITATIVE URINE: NEGATIVE
COCAINE METABOLITE QUANTITATIVE URINE: NEGATIVE
ERYTHROCYTE [DISTWIDTH] IN BLOOD BY AUTOMATED COUNT: 14.3 % (ref 11.5–14.5)
ERYTHROCYTE [DISTWIDTH] IN BLOOD BY AUTOMATED COUNT: 47.2 FL (ref 35–45)
GEL INDIRECT COOMBS: NORMAL
HCT VFR BLD CALC: 33.7 % (ref 37–47)
HEMOGLOBIN: 11.3 GM/DL (ref 12–16)
MCH RBC QN AUTO: 30.5 PG (ref 26–33)
MCHC RBC AUTO-ENTMCNC: 33.5 GM/DL (ref 32.2–35.5)
MCV RBC AUTO: 91.1 FL (ref 81–99)
OPIATES, URINE: NEGATIVE
OXYCODONE: NEGATIVE
PHENCYCLIDINE QUANTITATIVE URINE: NEGATIVE
PLATELET # BLD: 150 THOU/MM3 (ref 130–400)
PMV BLD AUTO: 10.8 FL (ref 9.4–12.4)
RBC # BLD: 3.7 MILL/MM3 (ref 4.2–5.4)
RH FACTOR: NORMAL
RPR: NONREACTIVE
WBC # BLD: 10.1 THOU/MM3 (ref 4.8–10.8)

## 2021-04-29 PROCEDURE — 2580000003 HC RX 258: Performed by: NURSE ANESTHETIST, CERTIFIED REGISTERED

## 2021-04-29 PROCEDURE — 86900 BLOOD TYPING SEROLOGIC ABO: CPT

## 2021-04-29 PROCEDURE — 2580000003 HC RX 258: Performed by: OBSTETRICS & GYNECOLOGY

## 2021-04-29 PROCEDURE — 2500000003 HC RX 250 WO HCPCS: Performed by: NURSE ANESTHETIST, CERTIFIED REGISTERED

## 2021-04-29 PROCEDURE — 3700000001 HC ADD 15 MINUTES (ANESTHESIA): Performed by: OBSTETRICS & GYNECOLOGY

## 2021-04-29 PROCEDURE — 6360000002 HC RX W HCPCS: Performed by: OBSTETRICS & GYNECOLOGY

## 2021-04-29 PROCEDURE — 86885 COOMBS TEST INDIRECT QUAL: CPT

## 2021-04-29 PROCEDURE — 80307 DRUG TEST PRSMV CHEM ANLYZR: CPT

## 2021-04-29 PROCEDURE — 85027 COMPLETE CBC AUTOMATED: CPT

## 2021-04-29 PROCEDURE — 6370000000 HC RX 637 (ALT 250 FOR IP): Performed by: OBSTETRICS & GYNECOLOGY

## 2021-04-29 PROCEDURE — 2500000003 HC RX 250 WO HCPCS: Performed by: OBSTETRICS & GYNECOLOGY

## 2021-04-29 PROCEDURE — 3609079900 HC CESAREAN SECTION: Performed by: OBSTETRICS & GYNECOLOGY

## 2021-04-29 PROCEDURE — 2500000003 HC RX 250 WO HCPCS

## 2021-04-29 PROCEDURE — 6360000002 HC RX W HCPCS

## 2021-04-29 PROCEDURE — 3700000000 HC ANESTHESIA ATTENDED CARE: Performed by: OBSTETRICS & GYNECOLOGY

## 2021-04-29 PROCEDURE — 36415 COLL VENOUS BLD VENIPUNCTURE: CPT

## 2021-04-29 PROCEDURE — 86592 SYPHILIS TEST NON-TREP QUAL: CPT

## 2021-04-29 PROCEDURE — 7100000001 HC PACU RECOVERY - ADDTL 15 MIN: Performed by: OBSTETRICS & GYNECOLOGY

## 2021-04-29 PROCEDURE — 86901 BLOOD TYPING SEROLOGIC RH(D): CPT

## 2021-04-29 PROCEDURE — 2709999900 HC NON-CHARGEABLE SUPPLY: Performed by: OBSTETRICS & GYNECOLOGY

## 2021-04-29 PROCEDURE — 6360000002 HC RX W HCPCS: Performed by: NURSE ANESTHETIST, CERTIFIED REGISTERED

## 2021-04-29 PROCEDURE — 1220000000 HC SEMI PRIVATE OB R&B

## 2021-04-29 PROCEDURE — 7100000000 HC PACU RECOVERY - FIRST 15 MIN: Performed by: OBSTETRICS & GYNECOLOGY

## 2021-04-29 RX ORDER — SODIUM CHLORIDE 0.9 % (FLUSH) 0.9 %
10 SYRINGE (ML) INJECTION EVERY 12 HOURS SCHEDULED
Status: DISCONTINUED | OUTPATIENT
Start: 2021-04-29 | End: 2021-04-29

## 2021-04-29 RX ORDER — FERROUS SULFATE 325(65) MG
325 TABLET ORAL DAILY
Status: DISCONTINUED | OUTPATIENT
Start: 2021-04-30 | End: 2021-05-01 | Stop reason: HOSPADM

## 2021-04-29 RX ORDER — KETOROLAC TROMETHAMINE 30 MG/ML
INJECTION, SOLUTION INTRAMUSCULAR; INTRAVENOUS PRN
Status: DISCONTINUED | OUTPATIENT
Start: 2021-04-29 | End: 2021-04-29 | Stop reason: SDUPTHER

## 2021-04-29 RX ORDER — KETOROLAC TROMETHAMINE 30 MG/ML
30 INJECTION, SOLUTION INTRAMUSCULAR; INTRAVENOUS EVERY 6 HOURS
Status: DISCONTINUED | OUTPATIENT
Start: 2021-04-29 | End: 2021-04-30

## 2021-04-29 RX ORDER — EPHEDRINE SULFATE/0.9% NACL/PF 50 MG/5 ML
SYRINGE (ML) INTRAVENOUS PRN
Status: DISCONTINUED | OUTPATIENT
Start: 2021-04-29 | End: 2021-04-29 | Stop reason: SDUPTHER

## 2021-04-29 RX ORDER — ONDANSETRON 2 MG/ML
INJECTION INTRAMUSCULAR; INTRAVENOUS PRN
Status: DISCONTINUED | OUTPATIENT
Start: 2021-04-29 | End: 2021-04-29 | Stop reason: SDUPTHER

## 2021-04-29 RX ORDER — SODIUM CHLORIDE 9 MG/ML
25 INJECTION, SOLUTION INTRAVENOUS PRN
Status: DISCONTINUED | OUTPATIENT
Start: 2021-04-29 | End: 2021-04-29

## 2021-04-29 RX ORDER — SODIUM CHLORIDE 0.9 % (FLUSH) 0.9 %
5-40 SYRINGE (ML) INJECTION PRN
Status: DISCONTINUED | OUTPATIENT
Start: 2021-04-29 | End: 2021-04-29

## 2021-04-29 RX ORDER — KETOROLAC TROMETHAMINE 30 MG/ML
30 INJECTION, SOLUTION INTRAMUSCULAR; INTRAVENOUS EVERY 6 HOURS
Status: DISCONTINUED | OUTPATIENT
Start: 2021-04-30 | End: 2021-04-30

## 2021-04-29 RX ORDER — ONDANSETRON 2 MG/ML
4 INJECTION INTRAMUSCULAR; INTRAVENOUS EVERY 6 HOURS PRN
Status: DISCONTINUED | OUTPATIENT
Start: 2021-04-29 | End: 2021-04-29 | Stop reason: HOSPADM

## 2021-04-29 RX ORDER — NALOXONE HYDROCHLORIDE 0.4 MG/ML
0.4 INJECTION, SOLUTION INTRAMUSCULAR; INTRAVENOUS; SUBCUTANEOUS PRN
Status: DISCONTINUED | OUTPATIENT
Start: 2021-04-29 | End: 2021-05-01 | Stop reason: HOSPADM

## 2021-04-29 RX ORDER — CARBOPROST TROMETHAMINE 250 UG/ML
250 INJECTION, SOLUTION INTRAMUSCULAR PRN
Status: DISCONTINUED | OUTPATIENT
Start: 2021-04-29 | End: 2021-05-01 | Stop reason: HOSPADM

## 2021-04-29 RX ORDER — ONDANSETRON 2 MG/ML
4 INJECTION INTRAMUSCULAR; INTRAVENOUS EVERY 6 HOURS PRN
Status: DISCONTINUED | OUTPATIENT
Start: 2021-04-29 | End: 2021-05-01 | Stop reason: HOSPADM

## 2021-04-29 RX ORDER — HYDROCODONE BITARTRATE AND ACETAMINOPHEN 5; 325 MG/1; MG/1
2 TABLET ORAL EVERY 4 HOURS PRN
Status: DISCONTINUED | OUTPATIENT
Start: 2021-04-30 | End: 2021-05-01 | Stop reason: HOSPADM

## 2021-04-29 RX ORDER — DOCUSATE SODIUM 100 MG/1
100 CAPSULE, LIQUID FILLED ORAL 2 TIMES DAILY
Status: DISCONTINUED | OUTPATIENT
Start: 2021-04-29 | End: 2021-05-01 | Stop reason: HOSPADM

## 2021-04-29 RX ORDER — FERROUS SULFATE 325(65) MG
325 TABLET ORAL
COMMUNITY

## 2021-04-29 RX ORDER — SODIUM CHLORIDE 0.9 % (FLUSH) 0.9 %
10 SYRINGE (ML) INJECTION PRN
Status: DISCONTINUED | OUTPATIENT
Start: 2021-04-29 | End: 2021-04-29

## 2021-04-29 RX ORDER — METHYLERGONOVINE MALEATE 0.2 MG/ML
200 INJECTION INTRAVENOUS PRN
Status: DISCONTINUED | OUTPATIENT
Start: 2021-04-29 | End: 2021-05-01 | Stop reason: HOSPADM

## 2021-04-29 RX ORDER — SODIUM CHLORIDE, SODIUM LACTATE, POTASSIUM CHLORIDE, CALCIUM CHLORIDE 600; 310; 30; 20 MG/100ML; MG/100ML; MG/100ML; MG/100ML
INJECTION, SOLUTION INTRAVENOUS CONTINUOUS
Status: DISCONTINUED | OUTPATIENT
Start: 2021-04-29 | End: 2021-04-29

## 2021-04-29 RX ORDER — IBUPROFEN 800 MG/1
800 TABLET ORAL EVERY 8 HOURS PRN
Status: DISCONTINUED | OUTPATIENT
Start: 2021-05-01 | End: 2021-04-30

## 2021-04-29 RX ORDER — SODIUM CHLORIDE, SODIUM LACTATE, POTASSIUM CHLORIDE, CALCIUM CHLORIDE 600; 310; 30; 20 MG/100ML; MG/100ML; MG/100ML; MG/100ML
INJECTION, SOLUTION INTRAVENOUS CONTINUOUS
Status: DISCONTINUED | OUTPATIENT
Start: 2021-04-29 | End: 2021-05-01 | Stop reason: HOSPADM

## 2021-04-29 RX ORDER — ACETAMINOPHEN 325 MG/1
650 TABLET ORAL EVERY 4 HOURS PRN
Status: DISCONTINUED | OUTPATIENT
Start: 2021-04-30 | End: 2021-05-01 | Stop reason: HOSPADM

## 2021-04-29 RX ORDER — SERTRALINE HYDROCHLORIDE 100 MG/1
100 TABLET, FILM COATED ORAL DAILY
Status: DISCONTINUED | OUTPATIENT
Start: 2021-04-29 | End: 2021-05-01 | Stop reason: HOSPADM

## 2021-04-29 RX ORDER — ACETAMINOPHEN 325 MG/1
975 TABLET ORAL ONCE
Status: COMPLETED | OUTPATIENT
Start: 2021-04-29 | End: 2021-04-29

## 2021-04-29 RX ORDER — MORPHINE SULFATE 0.5 MG/ML
INJECTION, SOLUTION EPIDURAL; INTRATHECAL; INTRAVENOUS PRN
Status: DISCONTINUED | OUTPATIENT
Start: 2021-04-29 | End: 2021-04-29 | Stop reason: SDUPTHER

## 2021-04-29 RX ORDER — KETOROLAC TROMETHAMINE 30 MG/ML
30 INJECTION, SOLUTION INTRAMUSCULAR; INTRAVENOUS EVERY 6 HOURS
Status: DISCONTINUED | OUTPATIENT
Start: 2021-04-30 | End: 2021-04-29

## 2021-04-29 RX ORDER — MODIFIED LANOLIN
OINTMENT (GRAM) TOPICAL
Status: DISCONTINUED | OUTPATIENT
Start: 2021-04-29 | End: 2021-05-01 | Stop reason: HOSPADM

## 2021-04-29 RX ORDER — OXYCODONE HYDROCHLORIDE AND ACETAMINOPHEN 5; 325 MG/1; MG/1
2 TABLET ORAL EVERY 6 HOURS PRN
Status: DISCONTINUED | OUTPATIENT
Start: 2021-04-29 | End: 2021-05-01 | Stop reason: HOSPADM

## 2021-04-29 RX ORDER — TRISODIUM CITRATE DIHYDRATE AND CITRIC ACID MONOHYDRATE 500; 334 MG/5ML; MG/5ML
15 SOLUTION ORAL ONCE
Status: COMPLETED | OUTPATIENT
Start: 2021-04-29 | End: 2021-04-29

## 2021-04-29 RX ORDER — SODIUM CHLORIDE 0.9 % (FLUSH) 0.9 %
5-40 SYRINGE (ML) INJECTION EVERY 12 HOURS SCHEDULED
Status: DISCONTINUED | OUTPATIENT
Start: 2021-04-29 | End: 2021-04-29

## 2021-04-29 RX ORDER — SODIUM CHLORIDE, SODIUM LACTATE, POTASSIUM CHLORIDE, AND CALCIUM CHLORIDE .6; .31; .03; .02 G/100ML; G/100ML; G/100ML; G/100ML
1000 INJECTION, SOLUTION INTRAVENOUS ONCE
Status: COMPLETED | OUTPATIENT
Start: 2021-04-29 | End: 2021-04-29

## 2021-04-29 RX ORDER — CEPHALEXIN 500 MG/1
500 CAPSULE ORAL EVERY 8 HOURS SCHEDULED
Status: COMPLETED | OUTPATIENT
Start: 2021-04-29 | End: 2021-05-01

## 2021-04-29 RX ORDER — METRONIDAZOLE 500 MG/1
500 TABLET ORAL EVERY 8 HOURS SCHEDULED
Status: COMPLETED | OUTPATIENT
Start: 2021-04-29 | End: 2021-05-01

## 2021-04-29 RX ORDER — PHENYLEPHRINE HCL IN 0.9% NACL 1 MG/10 ML
SYRINGE (ML) INTRAVENOUS PRN
Status: DISCONTINUED | OUTPATIENT
Start: 2021-04-29 | End: 2021-04-29 | Stop reason: SDUPTHER

## 2021-04-29 RX ORDER — SODIUM CHLORIDE, SODIUM LACTATE, POTASSIUM CHLORIDE, CALCIUM CHLORIDE 600; 310; 30; 20 MG/100ML; MG/100ML; MG/100ML; MG/100ML
INJECTION, SOLUTION INTRAVENOUS CONTINUOUS PRN
Status: DISCONTINUED | OUTPATIENT
Start: 2021-04-29 | End: 2021-04-29 | Stop reason: SDUPTHER

## 2021-04-29 RX ORDER — FENTANYL CITRATE 50 UG/ML
INJECTION, SOLUTION INTRAMUSCULAR; INTRAVENOUS PRN
Status: DISCONTINUED | OUTPATIENT
Start: 2021-04-29 | End: 2021-04-29 | Stop reason: SDUPTHER

## 2021-04-29 RX ORDER — DIPHENHYDRAMINE HYDROCHLORIDE 50 MG/ML
25 INJECTION INTRAMUSCULAR; INTRAVENOUS EVERY 6 HOURS PRN
Status: DISCONTINUED | OUTPATIENT
Start: 2021-04-29 | End: 2021-05-01 | Stop reason: HOSPADM

## 2021-04-29 RX ORDER — OXYTOCIN 10 [USP'U]/ML
INJECTION, SOLUTION INTRAMUSCULAR; INTRAVENOUS PRN
Status: DISCONTINUED | OUTPATIENT
Start: 2021-04-29 | End: 2021-04-29 | Stop reason: SDUPTHER

## 2021-04-29 RX ORDER — METOCLOPRAMIDE HYDROCHLORIDE 5 MG/ML
10 INJECTION INTRAMUSCULAR; INTRAVENOUS ONCE
Status: COMPLETED | OUTPATIENT
Start: 2021-04-29 | End: 2021-04-29

## 2021-04-29 RX ORDER — HYDROCODONE BITARTRATE AND ACETAMINOPHEN 5; 325 MG/1; MG/1
1 TABLET ORAL EVERY 4 HOURS PRN
Status: DISCONTINUED | OUTPATIENT
Start: 2021-04-30 | End: 2021-05-01 | Stop reason: HOSPADM

## 2021-04-29 RX ADMIN — SODIUM CHLORIDE, POTASSIUM CHLORIDE, SODIUM LACTATE AND CALCIUM CHLORIDE: 600; 310; 30; 20 INJECTION, SOLUTION INTRAVENOUS at 08:50

## 2021-04-29 RX ADMIN — OXYTOCIN 20 UNITS: 10 INJECTION, SOLUTION INTRAMUSCULAR; INTRAVENOUS at 08:05

## 2021-04-29 RX ADMIN — SODIUM CITRATE AND CITRIC ACID MONOHYDRATE 15 ML: 500; 334 SOLUTION ORAL at 07:10

## 2021-04-29 RX ADMIN — Medication 100 MCG: at 07:47

## 2021-04-29 RX ADMIN — SERTRALINE 100 MG: 100 TABLET, FILM COATED ORAL at 21:31

## 2021-04-29 RX ADMIN — Medication 25 MG: at 07:44

## 2021-04-29 RX ADMIN — Medication 87.3 MILLI-UNITS/MIN: at 08:50

## 2021-04-29 RX ADMIN — Medication 25 MG: at 07:45

## 2021-04-29 RX ADMIN — METOCLOPRAMIDE 10 MG: 5 INJECTION, SOLUTION INTRAMUSCULAR; INTRAVENOUS at 06:18

## 2021-04-29 RX ADMIN — MORPHINE SULFATE 0.2 MG: 0.5 INJECTION, SOLUTION EPIDURAL; INTRATHECAL; INTRAVENOUS at 07:41

## 2021-04-29 RX ADMIN — Medication 100 MCG: at 07:42

## 2021-04-29 RX ADMIN — ACETAMINOPHEN 975 MG: 325 TABLET ORAL at 06:21

## 2021-04-29 RX ADMIN — SODIUM CHLORIDE, POTASSIUM CHLORIDE, SODIUM LACTATE AND CALCIUM CHLORIDE: 600; 310; 30; 20 INJECTION, SOLUTION INTRAVENOUS at 15:44

## 2021-04-29 RX ADMIN — KETOROLAC TROMETHAMINE 30 MG: 30 INJECTION, SOLUTION INTRAMUSCULAR at 08:34

## 2021-04-29 RX ADMIN — SODIUM CHLORIDE, POTASSIUM CHLORIDE, SODIUM LACTATE AND CALCIUM CHLORIDE: 600; 310; 30; 20 INJECTION, SOLUTION INTRAVENOUS at 07:09

## 2021-04-29 RX ADMIN — CEFAZOLIN 2000 MG: 10 INJECTION, POWDER, FOR SOLUTION INTRAVENOUS at 07:30

## 2021-04-29 RX ADMIN — METRONIDAZOLE 500 MG: 500 TABLET ORAL at 21:27

## 2021-04-29 RX ADMIN — ONDANSETRON HYDROCHLORIDE 4 MG: 4 INJECTION, SOLUTION INTRAMUSCULAR; INTRAVENOUS at 07:35

## 2021-04-29 RX ADMIN — FAMOTIDINE 20 MG: 10 INJECTION, SOLUTION INTRAVENOUS at 06:19

## 2021-04-29 RX ADMIN — DOCUSATE SODIUM 100 MG: 100 CAPSULE, LIQUID FILLED ORAL at 21:28

## 2021-04-29 RX ADMIN — METRONIDAZOLE 500 MG: 500 TABLET ORAL at 14:28

## 2021-04-29 RX ADMIN — ENOXAPARIN SODIUM 40 MG: 40 INJECTION SUBCUTANEOUS at 21:27

## 2021-04-29 RX ADMIN — FENTANYL CITRATE 20 MCG: 50 INJECTION, SOLUTION INTRAMUSCULAR; INTRAVENOUS at 07:41

## 2021-04-29 RX ADMIN — SODIUM CHLORIDE, POTASSIUM CHLORIDE, SODIUM LACTATE AND CALCIUM CHLORIDE 1000 ML: 600; 310; 30; 20 INJECTION, SOLUTION INTRAVENOUS at 06:10

## 2021-04-29 RX ADMIN — CEPHALEXIN 500 MG: 500 CAPSULE ORAL at 14:28

## 2021-04-29 RX ADMIN — KETOROLAC TROMETHAMINE 30 MG: 30 INJECTION, SOLUTION INTRAMUSCULAR at 08:42

## 2021-04-29 RX ADMIN — CEPHALEXIN 500 MG: 500 CAPSULE ORAL at 21:27

## 2021-04-29 RX ADMIN — SODIUM CHLORIDE, POTASSIUM CHLORIDE, SODIUM LACTATE AND CALCIUM CHLORIDE: 600; 310; 30; 20 INJECTION, SOLUTION INTRAVENOUS at 08:05

## 2021-04-29 RX ADMIN — Medication 100 MCG: at 07:45

## 2021-04-29 ASSESSMENT — PAIN SCALES - GENERAL: PAINLEVEL_OUTOF10: 0

## 2021-04-29 NOTE — PLAN OF CARE
Problem: Anxiety:  Goal: Level of anxiety will decrease  Description: Level of anxiety will decrease  4/29/2021 0705 by Juhi Reid RN  Outcome: Ongoing   calm  Problem: Aspiration - Risk of:  Goal: Absence of aspiration  Description: Absence of aspiration  4/29/2021 0705 by Juhi Reid RN  Outcome: Ongoing   Respirations regular and easy  Problem: Tissue Perfusion - Uteroplacental, Altered:  Goal: Absence of abnormal fetal heart rate pattern  Description: Absence of abnormal fetal heart rate pattern  4/29/2021 0705 by Juhi Reid RN  Outcome: Ongoing   Reactive fht's  Problem: Venous Thromboembolism - Risk of:  Goal: Will show no signs or symptoms of venous thromboembolism  Description: Will show no signs or symptoms of venous thromboembolism  4/29/2021 0705 by Juhi Reid RN  Outcome: Ongoing   stable  Problem: Falls - Risk of:  Goal: Will remain free from falls  Description: Will remain free from falls  Outcome: Ongoing   No falls  Problem: Discharge Planning:  Goal: Discharged to appropriate level of care  Description: Discharged to appropriate level of care  Outcome: Ongoing   Verbalizes understanding of discharge from l/d after 2 hour recovery  Care plan reviewed with patient and Víctor. Patient and Griselda Couch verbalize understanding of the plan of care and contribute to goal setting.

## 2021-04-29 NOTE — FLOWSHEET NOTE
Patient presents to labor and delivery for scheduled  section. Instructed patient to change into gown and collect a clean catch urine. Verbalizes understanding.

## 2021-04-29 NOTE — L&D DELIVERY NOTE
PREOPERATIVE DIAGNOSES:     1.  38w5d week intrauterine pregnancy. 2.  ICP  3. Prev csxn  4. Anxiety  5. Hx of COVID      POSTOPERATIVE DIAGNOSES:     Same. PROCEDURE:  repeat low transverse  section with vacuum assist.      SURGEON:  Lukas Ahumada       ESTIMATED BLOOD LOSS:  985 mL. COMPLICATIONS:  None. ANESTHESIA:  spinal with duramorph. INDICATIONS:  Pt is a 30yo  weeks who presents for a csxn. TOtal bile acids were ordered  due to itching on her feet and hands. They were mildly elevated and her csxn was moved up from [address] [place] [address]. FINDINGS:    Information for the patient's :  Murrel Senate [[id number]]      ,  normal uterus, tubes and ovaries bilaterally. DETAILS OF PROCEDURE:  The patient was taken to the operating room where  spina anesthesia was performed. She was then placed in the dorsal supine  position with a leftward lateral tilt and prepped and draped in the normal  sterile fashion. A Pfannenstiel skin incision was made with the scalpel  and carried down to the underlying fascia with electrocautery which was incised at the midline and extended laterally with the curved izquierdo scissors. The rectus muscles were  dissected off the fascia with cautery and  in the midline. The peritoneum was  entered bluntly. The peritoneal opening was then extended superiorly and inferiorly with good visualization of the bladder. An Jesus-O self-retaining retractor was placed without difficulty. The vesicouterine peritoneum was identified and entered sharply with the [facility] scissors. The incision was extended laterally and a bladder flap was created digitally. The lower uterine segment was incised with a scalpel and the incision was extended laterally. Amniotomy was performed with bloody fluid noted. The head could not be delivered with available fundal pressure so a vacuum was placed.   With no pop-offs, the head was delivered and the Yes  Vacuum type: Silastic cup      Document Additional Attempt       Document Additional Attempt          Vacuum applied by: DR Janis Lopez    Failed?: No      Shoulder Dystocia    Shoulder dystocia present?: No  Add Second Maneuver  Add Third Maneuver  Add Fourth Maneuver  Add Fifth Maneuver  Add Sixth Maneuver  Add Seventh Maneuver  Add Eighth Maneuver  Add Ninth Maneuver     [place] Presentation    Presentation: Vertex  _: Occiput     [place] Information    Head delivery date/time: 2021 08:04:00   Changing the 's delivery date/time could affect patient care.:    Delivery date/time:  21 3608   Delivery type: , Low Transverse    Details:  Trial of labor?: No    categorization: Repeat    priority: Scheduled   Indications for : Prior Uterine Surgery   Skin Incision Type: Low Transverse   Uterine Incision: Low Transverse         Delivery Providers    Delivering clinician: Lila Mata,    Provider Role    Darien Loo RN Registered Nurse    Ulysses Moynahan, RN Registered Nurse    Franco Parham, APRN - CRNA Nurse Anesthetist    Bita Godley, P Respiratory Therapist (Day)      Cord    Vessels: 3 Vessels  Complications: None  Delayed cord clamping?: Yes  Cord clamped date/time: 2021 0805  Cord blood disposition: Refrigerator  Gases sent?: No  Stem cell collection (by provider): No     Placenta    Date/time: 2021 08:06:00  Removal: Expressed  Appearance: Intact  Disposition: Refrigerator     Delivery Resuscitation    Method: Bulb Suction, Stimulation     Apgars    Living status: Living  Apgars   1 Minute:  5 Minute:  10 Minute 15 Minute 20 Minute   Skin Color: 0  1       Heart Rate: 2  2       Reflex Irritability: 2  2       Muscle Tone: 2  2       Respiratory Effort: 2  2       Total: 8  9                  Skin to Skin    Skin to skin initiation date/time: 21 08:46:00   Skin to skin with:  Mother  Skin to skin end date/time:         Measurements    Weight: 3685 g Length: 49.5 cm   Head circumference: 36.2 cm Chest circumference: 33.7 cm      Delivery Information    Episiotomy: None  Perineal lacerations: None    Vaginal laceration: No    Cervical laceration: No    Surgical or additional est. blood loss (mL): 0 (View Only): Edit in Flowsheets   Combined est. blood loss (mL): 0

## 2021-04-29 NOTE — PLAN OF CARE
Problem: Pain:  Goal: Pain level will decrease  Description: Pain level will decrease  Outcome: Ongoing  Note: Goal is 6, medications discussed and ice to incision     Problem: Fluid Volume - Imbalance:  Goal: Absence of postpartum hemorrhage signs and symptoms  Description: Absence of postpartum hemorrhage signs and symptoms  Outcome: Ongoing  Note: Minimal bleeding noted     Problem: Infection - Surgical Site:  Goal: Will show no infection signs and symptoms  Description: Will show no infection signs and symptoms  Outcome: Ongoing  Note: No foul smelling drainage noted     Problem: Mood - Altered:  Goal: Mood stable  Description: Mood stable  Outcome: Ongoing  Note: Bonding well with infant     Problem: Nausea/Vomiting:  Goal: Absence of nausea/vomiting  Description: Absence of nausea/vomiting  Outcome: Ongoing  Note: Denies nausea      Problem: Pain - Acute:  Goal: Pain level will decrease  Description: Pain level will decrease  Outcome: Ongoing  Note: Goal is 6, medications discussed and ice to incision     Problem: Urinary Retention:  Goal: Urinary elimination within specified parameters  Description: Urinary elimination within specified parameters  Outcome: Ongoing  Note: Brooks cath draining clear, yellow urine. Problem: Venous Thromboembolism:  Goal: Absence of signs or symptoms of impaired coagulation  Description: Absence of signs or symptoms of impaired coagulation  Outcome: Ongoing  Note: Neg homans, scd's are on   Care plan reviewed with patient. Patient verbalize understanding of the plan of care and contribute to goal setting.

## 2021-04-29 NOTE — ANESTHESIA PROCEDURE NOTES
Spinal Block    Patient location during procedure: OB  Reason for block: primary anesthetic  Staffing  Performed: resident/CRNA   Anesthesiologist: Suki Waldron DO  Resident/CRNA: Nlie Hendricks APRN - CRNA  Preanesthetic Checklist  Completed: patient identified, IV checked, site marked, risks and benefits discussed, surgical consent, monitors and equipment checked, pre-op evaluation, timeout performed, anesthesia consent given, oxygen available and patient being monitored  Spinal Block  Patient position: sitting  Prep: ChloraPrep  Patient monitoring: continuous pulse ox and frequent blood pressure checks  Approach: midline  Location: L3/L4  Provider prep: mask and sterile gloves  Local infiltration: lidocaine  Dose: 0.4  Agent: bupivacaine  Adjuvant: duramorph (duramorph and fentanyl)  Dose: 1.6  Dose: 1.6  Needle  Needle type: Pencan   Needle gauge: 25 G  Needle length: 3.5 in  Assessment  Sensory level: T4  Swirl obtained: Yes  CSF: clear  Attempts: 1  Hemodynamics: stable

## 2021-04-29 NOTE — INTERVAL H&P NOTE
[address] [address]  History and Physical Update    Pt Name: Cherre Spatz  MRN: [id number]  YOB: 1990  Date of evaluation: 4/29/2021    I have examined the patient and reviewed the H&P/Consult and there are no changes to the patient or plans.       Maria Elena Memos  Electronically signed 4/29/2021 at 7:36 AM

## 2021-04-29 NOTE — FLOWSHEET NOTE
Monitors applied. Oriented to room.  at bedside. Questions and concerns addressed. Call light within reach.

## 2021-04-29 NOTE — H&P
[facility]  History and Physical    Patient Name: Sabina Patterson   Patient ID: [id number]   Sex: Female   YOB: 1990         Create Date: 2021                   Chief Complaint      pregnancy with previous csxn           History Of Present Illness   This is a 36year old /White female, , who is at 45 weeks gestation with an CHRISTOPHER of 2021 presenting for Repeat  Section. Patient has a medical history significant for Anxiety / Depression and History of COVID-19. Her pregnancy has been complicated by elevated total bile acids this week after developing itching in her hands and feet. Patient admits fetal movements and denies contractions and leaking of fluid.          Past Medical History   Disease Name Date Onset Notes   Anxiety / Depression --  --    Anxiety during pregnancy in third trimester, antepartum 2021 --    Chlamydia infection affecting pregnancy 09/10/18 re-test 18   COVID-19, history of 2021 --    History of COVID-19 2021 --    Previous  delivery affecting pregnancy 2021 --            Past Surgical History   Procedure Name Date Notes    Section, Primary 10/06/2017 Girish Pretty, [facility]   Repeat low transverse  section 19 Dr. Jose Gonzalez, [facility]           Medication List   Name Date Started Instructions   ferrous sulfate 325 mg (65 mg iron) tablet 2021 take 1 tablet by oral route every other day for 30 days   multivitamin oral  --    sertraline 100 mg oral tablet 2021 take 1 tablet (100 mg) by oral route once daily for 30 days   sertraline 50 mg tablet 2021 take 1 tablet (50 mg) by oral route once daily - Take with the 100mg tablet to take a total of 150mg qd           Allergy List   Allergen Name Date Reaction Notes   Bee Stings --  --  2020 -    NO KNOWN DRUG ALLERGIES --  --  2020 -    No Known Food Allergies --  --  2020 -            Family Medical History Disease Name Relative/Age Notes   Family history of melanoma Grandmother (maternal)/   --    Family history of heart attack Grandfather (paternal)/   --            Reproductive History   Menstrual   Age Menarche: 17 Cycle Interval(Days): 28 Menses Duration(Days): 4   Flow: Heavy Last Menstrual Period: 2020 Home Pregnancy Test: Positve   Menopause Status: Premenopausal Method of Birth Control: None Clots?: No   Breakthrough Bleeding?: No HRT?: No   Pregnancy Summary   Total Pregnancies: 3 Full Term: 2 Premature: 0   Ab Induced: 0 Ab Spontaneous: 0 Ectopics: 0   Multiples: 0 Livin   Pregnancy Details    Date GA Hrs Labor Birth Wt Sex Type Delivery Anes? Early Labor? Comments/ Complications Location   10/06/2017 41 31 7lb 3oz Female C-Sect. Spinal  failure to progress, meconium [facility]   2019 41  7lbs 8oz Female C-Sect. Spinal  prev csxn, anemia, thrombocytopenia, GERD [facility]           Social History   Finding Status Start/Stop Quantity Notes   Age of first sexual encounter --  --/-- --  14   Alcohol Former --/-- --  22    Bachelor's Degree --  --/-- --  2020 -    Denies emotional/verbal abuse --  --/-- --  2020   Denies knowledge of exposure to hazardous substances --  --/-- --  2020   Denies physical abuse --  --/-- --  2020   Denies Sexual Abuse --  --/-- --  2020   Did not serve --  --/-- --  2020   Heavy Amount of Exercise (4 or more times weekly) --  --/-- --  2020   History of Chicken Pox --  --/-- --  20    History of Chlamydia --  --/-- --  2018   IV drug abuse Never --/-- --  2020 -    Lifetime partners --  --/-- --  2020 - under 10   Marijuana Never --/-- --  20    --  --/-- --  2020 - [facility] Family Physician --  --/-- --  2020    Other --  --/-- --  2020  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     Partners in the last 6 months --  --/-- --  2020 - 1 09/10/2018   Tobacco Never --/-- --  9/24/20    Uses seat belts --  --/-- --  09/24/2020           Immunizations   NameDate Admin Mfg Trade Name Lot Number Route Inj VIS Given VIS Publication   InfluenzaRefused 09/10/2018 NE Not Entered  NE NE     Comments:    Tdap02/08/2019 SKB BOOSTRIX [id number] IM LD 02/08/2019    Comments:            Review of Systems   ConstitutionalDenies : fever, body aches, weight loss, additional symptoms except as noted in the HPI   EyesDenies : impaired vision, additional symptoms except as noted in the HPI   HENTDenies : headaches, sinus congestion, neck stiffness, sore throat, decreased hearing, additional symptoms except as noted in the HPI   BreastsDenies : nipple discharge, additional symptoms except as noted in the HPI   CardiovascularDenies : chest pain, irregular heart beats, syncope, lower extremity edema, palpitations, additional symptoms except as noted in the HPI   RespiratoryDenies : shortness of breath, wheezing, cough, additional symptoms except as noted in the HPI   GastrointestinalDenies : nausea, vomiting, diarrhea, constipation, reflux, abdominal pain, blood in stools, additional symptoms except as noted in the HPI   GenitourinaryDenies : urgency, frequency, dysuria, incontinence, additional symptoms except as noted in the HPI   IntegumentDenies : rash, changes to existing skin lesions or moles, additional symptoms except as noted in the HPI   NeurologicDenies : muscular weakness, incoordination, tingling or numbness, headache, additional symptoms except as noted in the HPI   MusculoskeletalDenies : joint pain, muscle pain, additional symptoms except as noted in the HPI   EndocrineDenies : cold intolerance, heat intolerance, additional symptoms except as noted in the HPI   PsychiatricDenies : addtional symptoms except as noted in the HPI   Heme-LymphDenies : easy bruising, lymph node enlargement or tenderness, addtional symptoms except as noted in the HPI Allergic-ImmunologicDenies : frequent illnesses, addtional symptoms except as noted in the HPI       Vitals   120/70 Sitting       165lbs 16oz 5'  2\" 30.36 1.81         Physical Examination   ConstitutionalAppearance : well-nourished, well developed, alert, in no acute distress   EyesConjunctiva/Eyelids : conjunctiva normal, eyelid appearance normal   Sclera : sclera white   HENTHead and Face :   Head : normocephalic, atraumatic   Ears :   External Ears : external ears within normal limits   Hearing : hearing intact bilaterally   Nose :   External Nose : external nose normal in appearance, nares patent, nasal discharge absent   Mouth :   General : appearance normal   Lips : lip appearance normal   NeckInspection/Palpation : normal appearance, no masses or tenderness   Lymph Nodes : no lymphadenopathy present   Range of Motion : neck supple with full range of motion   Thyroid : gland size normal, nontender, no nodules or masses present on palpation   ChestRespiratory Effort : breathing unlabored   Auscultation : normal breath sounds, no rales, no rhonchi   CardiovascularHeart :    Auscultation : regular rate, normal rhythm, no murmurs present   BreastsInspection of Breasts : Breasts symmetrical, no skin changes, no discharge present   Palpation of Breasts and Axillae : No masses present on palpation, no breast tenderness   Axillary Lymph Nodes : no lymphadenopathy present   GastrointestinalAbdominal Examination : abdomen nontender to palpation, normal bowel sounds, tone normal without rigidity or guarding, no masses present, umbilicus without lesions   Liver and spleen : no hepatomegaly present, liver nontender to palpation   Hernias : no hernias present   GenitourinaryExternal Genitalia : normal appearance for age, no discharge present, no tenderness present, no inflammatory lesions present   Vagina : normal vaginal vault without central or paravaginal defects, no discharge present, no inflammatory lesions present, no masses present   Bladder : nontender to palpation   Urethra :   Urethral Body : urethra palpation normal, urethra structural support normal   Urethral Meatus : no erythema or lesions present   Cervix : appearance healthy, no lesions present, nontender to palpation, no bleeding present   Uterus : nontender to palpation, no masses present, position midline/midplane, mobility: normal   Adnexa : no adnexal tenderness present, no adnexal masses present   Perineum : perineum within normal limits, no evidence of trauma, no rashes or skin lesions present   Anus : anus within normal limits, no hemorrhoids present   Inguinal Lymph Nodes : no lymphadenopathy present   LymphaticLymph Nodes : no other lymphadenopathy present   SkinGeneral Inspection : no rashes present, no lesions present, no areas of discoloration   Body Hair : general body hair distribution normal   Pubic Hair : normal pubic hair distribution for age   Genitalia and Groin : no rashes present, no lesions present, no areas of discoloration, no masses present   Neurologic/PsychiatricMental Status :   Orientation : grossly oriented to person, place and time   Mood and Affect : mood normal, affect appropriate   Cranial Nerves : cranial nerves intact bilaterally           Assessment     38 weeks gestation of pregnancy     V22.2/Z3A.38    Intrahepatic cholestasis of pregnancy       Liver and biliary tract disorders in pregnancy, unspecified trimester     646.70/O26.619  Obstruction of bile duct     646.70/K83.1    Previous  delivery affecting pregnancy     654.20/O34.21    Anxiety during pregnancy in third trimester, antepartum       Other mental disorders complicating pregnancy, third trimester     648.43/O99.343  Anxiety disorder, unspecified     648.43/F41.9    History of COVID-19     V12.09/Z86.16    Plan   InstructionsRisks and benefits of a repeat csxn were discussed with pt due to her hx of 2 csxns. She is aware she is not a  candidate in [address] [place] [address].

## 2021-04-29 NOTE — ANESTHESIA PRE PROCEDURE
Department of Anesthesiology  Preprocedure Note       Name:  Kwaku Estrada   Age:  36 y.o.  :  1990                                          MRN:  [id number]         Date:  2021      Surgeon: Veda Rebolledo):  Alisa Gr DO    Procedure: Procedure(s):  REPEAT  SECTION    Medications prior to admission:   Prior to Admission medications    Medication Sig Start Date End Date Taking?  Authorizing Provider   ferrous sulfate (IRON 325) 325 (65 Fe) MG tablet Take 325 mg by mouth every 48 hours   Yes Historical Provider, MD   sertraline (ZOLOFT) 100 MG tablet Take 100 mg by mouth daily   Yes Historical Provider, MD   aspirin 81 MG EC tablet Take 81 mg by mouth daily   Yes Historical Provider, MD   Prenatal MV-Min-Fe Fum-FA-DHA (PRENATAL 1 PO) Take 1 tablet by mouth daily   Yes Historical Provider, MD       Current medications:    Current Facility-Administered Medications   Medication Dose Route Frequency Provider Last Rate Last Admin    lactated ringers infusion   Intravenous Continuous Nevada Simón,  mL/hr at 21 0709 New Bag at 21 0709    sodium chloride flush 0.9 % injection 10 mL  10 mL Intravenous 2 times per day Nevada Simón, DO        sodium chloride flush 0.9 % injection 10 mL  10 mL Intravenous PRN Nevada Simón, DO        0.9 % sodium chloride infusion  25 mL Intravenous PRN Alisa Gr, DO        oxytocin (PITOCIN) 10 unit bolus from the bag  10 Units Intravenous PRN Alisa Gr, DO        And    oxytocin (PITOCIN) 30 units in 500 mL infusion  87.3 susie-units/min Intravenous Continuous PRN Alisa Gr, DO        ondansetron [facility]) injection 4 mg  4 mg Intravenous Q6H PRN [place], DO        ceFAZolin (ANCEF) 2000 mg in dextrose 5 % 50 mL IVPB  2,000 mg Intravenous Once Nevada Simón, DO           Allergies:  No Known Allergies    Problem List:    Patient Active Problem List   Diagnosis Code    Delivery by  section of full-term infant O80    Pregnancy complication before birth O80.65       Past Medical History:        Diagnosis Date    Anemia     Anxiety disorder     Depression        Past Surgical History:        Procedure Laterality Date     SECTION N/A 10/6/2017     SECTION performed by Onur Parks DO at [facility] 480 N/A 2019     SECTION performed [facility] Dom [facility]meron DO [facility]       Social History:    Social History     Tobacco Use    Smoking status: Never Smoker    Smokeless tobacco: Never Used   Substance Use Topics    Alcohol use: No                                Counseling given: Not Answered      Vital Signs (Current):   Vitals:    21 0545 21 0605   BP: 109/70    Resp: 16    Temp: 36.7 °C (98 °F)    TempSrc: Temporal    Weight:  180 lb (81.6 kg)   Height:  5' 2\" (1.575 m)                                              BP Readings from Last 3 Encounters:   21 109/70   21 123/77   21 117/77       NPO Status: Time of last liquid consumption:                         Time of last solid consumption:                         Date of last liquid consumption: 21                        Date of last solid food consumption: 21    BMI:   Wt Readings from Last 3 Encounters:   21 180 lb (81.6 kg)   21 180 lb (81.6 kg)   19 170 lb (77.1 kg)     Body mass index is 32.92 kg/m².     CBC:   Lab Results   Component Value Date    WBC 10.1 2021    RBC 3.70 2021    HGB 11.3 2021    HCT 33.7 2021    MCV 91.1 2021    RDW 15.5 10/08/2017     2021       CMP:   Lab Results   Component Value Date     2021    K 3.6 2021     2021    CO2 19 2021    BUN 6 2021    CREATININE 0.5 2021    LABGLOM >90 2021    GLUCOSE 88 2021    PROT 6.2 2021    CALCIUM 9.3 2021    BILITOT 0.2 2021 ALKPHOS 116 04/24/2021    AST 21 04/24/2021    ALT 24 04/24/2021       POC Tests: No results for input(s): POCGLU, POCNA, POCK, POCCL, POCBUN, POCHEMO, POCHCT in the last 72 hours. Coags: No results found for: PROTIME, INR, APTT    HCG (If Applicable): No results found for: PREGTESTUR, PREGSERUM, HCG, HCGQUANT     ABGs: No results found for: PHART, PO2ART, KVO8YPB, PFW4OWP, BEART, Y6ZJMCOP     Type & Screen (If Applicable):  Lab Results   Component Value Date    LABRH POS 05/01/2019       Drug/Infectious Status (If Applicable):  No results found for: HIV, HEPCAB    COVID-19 Screening (If Applicable): No results found for: COVID19        Anesthesia Evaluation  Patient summary reviewed and Nursing notes reviewed no history of anesthetic complications:   Airway: Mallampati: II  TM distance: >3 FB   Neck ROM: full  Mouth opening: > = 3 FB Dental: normal exam         Pulmonary:Negative Pulmonary ROS                              Cardiovascular:Negative CV ROS  Exercise tolerance: good (>4 METS),                     Neuro/Psych:   (+) psychiatric history:depression/anxiety             GI/Hepatic/Renal: Neg GI/Hepatic/Renal ROS            Endo/Other: Negative Endo/Other ROS                    Abdominal:           Vascular: negative vascular ROS. Anesthesia Plan      spinal     ASA 2             Anesthetic plan and risks discussed with patient. Plan discussed with attending. RANI Crespo - CRNA   4/29/2021        DOS STAFF ADDENDUM:    Chart reviewed. Pt seen and examined. Plan as above, discussed with CRNA. Discussed risks, benefits, alternatives, and personnel involved with patient, who agrees to proceed.       Gemma Infante DO  Staff Anesthesiologist  8:42 AM

## 2021-04-29 NOTE — FLOWSHEET NOTE
Patient to bathroom to void on arrival,  informed of maternal drug testing policy in place on all laboring patients. Verbal consent received, paper consent to be signed and urine to be sent. Explained patients right to have family, representative or physician notified of their admission. Patient has Declined for physician to be notified. Patient has Declined for family/representative to be notified.

## 2021-04-30 LAB
BASOPHILS # BLD: 0.4 %
BASOPHILS ABSOLUTE: 0 THOU/MM3 (ref 0–0.1)
EOSINOPHIL # BLD: 1.7 %
EOSINOPHILS ABSOLUTE: 0.1 THOU/MM3 (ref 0–0.4)
ERYTHROCYTE [DISTWIDTH] IN BLOOD BY AUTOMATED COUNT: 14.5 % (ref 11.5–14.5)
ERYTHROCYTE [DISTWIDTH] IN BLOOD BY AUTOMATED COUNT: 50 FL (ref 35–45)
HCT VFR BLD CALC: 27.5 % (ref 37–47)
HEMOGLOBIN: 8.7 GM/DL (ref 12–16)
IMMATURE GRANS (ABS): 0.04 THOU/MM3 (ref 0–0.07)
IMMATURE GRANULOCYTES: 0.5 %
LYMPHOCYTES # BLD: 20.7 %
LYMPHOCYTES ABSOLUTE: 1.6 THOU/MM3 (ref 1–4.8)
MCH RBC QN AUTO: 29.8 PG (ref 26–33)
MCHC RBC AUTO-ENTMCNC: 31.6 GM/DL (ref 32.2–35.5)
MCV RBC AUTO: 94.2 FL (ref 81–99)
MONOCYTES # BLD: 5.4 %
MONOCYTES ABSOLUTE: 0.4 THOU/MM3 (ref 0.4–1.3)
NUCLEATED RED BLOOD CELLS: 0 /100 WBC
PLATELET # BLD: 132 THOU/MM3 (ref 130–400)
PMV BLD AUTO: 10.3 FL (ref 9.4–12.4)
RBC # BLD: 2.92 MILL/MM3 (ref 4.2–5.4)
SEG NEUTROPHILS: 71.3 %
SEGMENTED NEUTROPHILS ABSOLUTE COUNT: 5.6 THOU/MM3 (ref 1.8–7.7)
WBC # BLD: 7.8 THOU/MM3 (ref 4.8–10.8)

## 2021-04-30 PROCEDURE — 1220000000 HC SEMI PRIVATE OB R&B

## 2021-04-30 PROCEDURE — 6360000002 HC RX W HCPCS: Performed by: ANESTHESIOLOGY

## 2021-04-30 PROCEDURE — 6370000000 HC RX 637 (ALT 250 FOR IP): Performed by: OBSTETRICS & GYNECOLOGY

## 2021-04-30 PROCEDURE — 36415 COLL VENOUS BLD VENIPUNCTURE: CPT

## 2021-04-30 PROCEDURE — 6360000002 HC RX W HCPCS: Performed by: OBSTETRICS & GYNECOLOGY

## 2021-04-30 PROCEDURE — 85025 COMPLETE CBC W/AUTO DIFF WBC: CPT

## 2021-04-30 RX ORDER — HYDROCODONE BITARTRATE AND ACETAMINOPHEN 5; 325 MG/1; MG/1
1 TABLET ORAL
Qty: 20 TABLET | Refills: 0 | Status: SHIPPED | OUTPATIENT
Start: 2021-04-30 | End: 2021-05-07

## 2021-04-30 RX ORDER — IBUPROFEN 800 MG/1
800 TABLET ORAL EVERY 8 HOURS PRN
Status: DISCONTINUED | OUTPATIENT
Start: 2021-04-30 | End: 2021-05-01 | Stop reason: HOSPADM

## 2021-04-30 RX ADMIN — HYDROCODONE BITARTRATE AND ACETAMINOPHEN 2 TABLET: 5; 325 TABLET ORAL at 11:50

## 2021-04-30 RX ADMIN — HYDROCODONE BITARTRATE AND ACETAMINOPHEN 2 TABLET: 5; 325 TABLET ORAL at 16:51

## 2021-04-30 RX ADMIN — METRONIDAZOLE 500 MG: 500 TABLET ORAL at 06:44

## 2021-04-30 RX ADMIN — ENOXAPARIN SODIUM 40 MG: 40 INJECTION SUBCUTANEOUS at 20:56

## 2021-04-30 RX ADMIN — CEPHALEXIN 500 MG: 500 CAPSULE ORAL at 22:03

## 2021-04-30 RX ADMIN — METRONIDAZOLE 500 MG: 500 TABLET ORAL at 22:03

## 2021-04-30 RX ADMIN — DOCUSATE SODIUM 100 MG: 100 CAPSULE, LIQUID FILLED ORAL at 10:00

## 2021-04-30 RX ADMIN — IBUPROFEN 800 MG: 800 TABLET, FILM COATED ORAL at 15:19

## 2021-04-30 RX ADMIN — HYDROCODONE BITARTRATE AND ACETAMINOPHEN 2 TABLET: 5; 325 TABLET ORAL at 22:20

## 2021-04-30 RX ADMIN — CEPHALEXIN 500 MG: 500 CAPSULE ORAL at 06:43

## 2021-04-30 RX ADMIN — CEPHALEXIN 500 MG: 500 CAPSULE ORAL at 14:17

## 2021-04-30 RX ADMIN — FERROUS SULFATE TAB 325 MG (65 MG ELEMENTAL FE) 325 MG: 325 (65 FE) TAB at 09:59

## 2021-04-30 RX ADMIN — KETOROLAC TROMETHAMINE 30 MG: 30 INJECTION, SOLUTION INTRAMUSCULAR; INTRAVENOUS at 06:43

## 2021-04-30 RX ADMIN — METRONIDAZOLE 500 MG: 500 TABLET ORAL at 14:17

## 2021-04-30 RX ADMIN — DOCUSATE SODIUM 100 MG: 100 CAPSULE, LIQUID FILLED ORAL at 20:56

## 2021-04-30 RX ADMIN — SERTRALINE 100 MG: 100 TABLET, FILM COATED ORAL at 20:58

## 2021-04-30 RX ADMIN — KETOROLAC TROMETHAMINE 30 MG: 30 INJECTION, SOLUTION INTRAMUSCULAR; INTRAVENOUS at 00:54

## 2021-04-30 ASSESSMENT — PAIN SCALES - GENERAL
PAINLEVEL_OUTOF10: 4
PAINLEVEL_OUTOF10: 7
PAINLEVEL_OUTOF10: 2
PAINLEVEL_OUTOF10: 7

## 2021-04-30 NOTE — FLOWSHEET NOTE
Pt up to bathroom and voided a large amount. Janet care per pt. Scant amount of lochia rubra noted. Back to bed, gait steady. Pt denies needs.

## 2021-04-30 NOTE — PLAN OF CARE
Problem: Pain:  Goal: Pain level will decrease  Description: Pain level will decrease  4/30/2021 1011 by Jennifer Jerome RN  Outcome: Ongoing  Note: Goal is 6, pt using ice to incision, medications discussed     Problem: Fluid Volume - Imbalance:  Goal: Absence of postpartum hemorrhage signs and symptoms  Description: Absence of postpartum hemorrhage signs and symptoms  4/30/2021 1011 by Jennifer Jerome RN  Outcome: Ongoing  Note: Minimal bleeding noted     Problem: Infection - Surgical Site:  Goal: Will show no infection signs and symptoms  Description: Will show no infection signs and symptoms  4/30/2021 1011 by Jennifer Jerome RN  Outcome: Ongoing  Note: No foul smelling drainage noted     Problem: Mood - Altered:  Goal: Mood stable  Description: Mood stable  4/30/2021 1011 by Jennifer Jerome RN  Outcome: Ongoing  Note: Bonding well with infant     Problem: Pain - Acute:  Goal: Pain level will decrease  Description: Pain level will decrease  4/30/2021 1011 by Jennifer Jerome RN  Outcome: Ongoing  Note: Goal is 6, pt using ice to incision, medications discussed     Problem: Urinary Retention:  Goal: Urinary elimination within specified parameters  Description: Urinary elimination within specified parameters  4/30/2021 1011 by Jennifer Jerome RN  Outcome: Ongoing  Note: Voiding without difficulty     Problem: Venous Thromboembolism:  Goal: Absence of signs or symptoms of impaired coagulation  Description: Absence of signs or symptoms of impaired coagulation  4/30/2021 1011 by Jennifer Jerome RN  Outcome: Ongoing  [facility]: [facility]   Care plan reviewed with patient. Patient verbalize understanding of the plan of care and contribute to goal setting.

## 2021-04-30 NOTE — LACTATION NOTE
Pt. Stated she has no questions or concerns at this time. Encouraged pt. To call lactation for assistance.

## 2021-04-30 NOTE — FLOWSHEET NOTE
Pt up to bathroom and voided a large amount. Janet care and teaching given, scant amount of lochia rubra noted. Back to bed, gait steady. Pt denies needs.

## 2021-04-30 NOTE — PROGRESS NOTES
Subjective:     Postpartum Day 1:  Delivery    Patient doing well. Pain well controlled. Mild lochia. Breastfeeding without difficulty. Urination without difficulty. Positive flatus and no bowel movement. Objective:        Vitals:    21 0830   BP: (!) 103/57   Pulse: 76   Resp: 18   Temp: 98 °F (36.7 °C)   SpO2: 100%         General:    alert and cooperative       Abdomen: Soft, nontender. Bowel sounds:    deferred   Incision:  covered, dry   Extremities:  warm and dry. No edema. CBC   Lab Results   Component Value Date    WBC 7.8 2021    HGB 8.7 (L) 2021    HCT 27.5 (L) 2021    MCV 94.2 2021     2021        Assessment:     Status post  section.  Anemia    Plan:     Continue current care, discharge home tomorrow 21      Electronically signed by RANI Nowak CNP on 2021 at 10:31 AM

## 2021-04-30 NOTE — DISCHARGE SUMMARY
Obstetric Discharge Summary      Pt Name: Cara Venegas  MRN: [id number] Estelita #: [de-identified]  YOB: 1990        Admitting Diagnosis  IUP  OB History        3    Para   3    Term   3       0    AB   0    Living   3       SAB   0    TAB   0    Ectopic   0    Molar   0    Multiple   0    Live Births   3                Reasons for Admission on 2021  5:34 AM   delivery delivered [O82]    Postpartum/Operative Complications       [facility]  Information for the patient's :  Leona Toure [[id number]]   male   Birth Weight: 8 lb 2 oz (3.685 kg)       Discharge Diagnosis  Postpartum, Vaginal Delivery    Discharge Information  Current Discharge Medication List      START taking these medications    Details   HYDROcodone-acetaminophen (NORCO) 5-325 MG per tablet Take 1 tablet by mouth every 4-6 hours as needed for Pain for up to 7 days.   Qty: 20 tablet, Refills: 0    Comments: Reduce doses taken as pain becomes manageable  Associated Diagnoses:  delivery delivered         CONTINUE these medications which have NOT CHANGED    Details   ferrous sulfate (IRON 325) 325 (65 Fe) MG tablet Take 325 mg by mouth every 48 hours      sertraline (ZOLOFT) 100 MG tablet Take 100 mg by mouth daily      aspirin 81 MG EC tablet Take 81 mg by mouth daily      Prenatal MV-Min-Fe Fum-FA-DHA (PRENATAL 1 PO) Take 1 tablet by mouth daily                 C/S Delivery  Diet regular    Condition: Stable  Discharge to:  home  Follow up in 1 week    Patient to be discharged on 21    Electronically signed by RANI Saldana CNP on 2021 at 10:34 AM

## 2021-04-30 NOTE — PLAN OF CARE
Problem: Pain:  Goal: Pain level will decrease  Description: Pain level will decrease  4/30/2021 0225 by Garrett Arce RN  Outcome: Ongoing  Note: Pain controlled with Duramorph and Toradol. Discussed ice for perineal pain and/or incisional pain or the use of warm blanket/heating pad for uterine cramps. Pt states her pain goal 6/10 has been met. Problem: Fluid Volume - Imbalance:  Goal: Absence of postpartum hemorrhage signs and symptoms  Description: Absence of postpartum hemorrhage signs and symptoms  4/30/2021 0225 by Garrett Arce RN  Outcome: Ongoing  Note: Scant to small amt of lochia noted. Problem: Infection - Surgical Site:  Goal: Will show no infection signs and symptoms  Description: Will show no infection signs and symptoms  4/30/2021 0225 by Garrett Arce RN  Outcome: Ongoing  Note: Dressing dry and intact. Problem: Mood - Altered:  Goal: Mood stable  Description: Mood stable  4/30/2021 0225 by Garrett Arce RN  Outcome: Ongoing  Note: Bonding with baby, participating in infant care. Problem: Nausea/Vomiting:  Goal: Absence of nausea/vomiting  Description: Absence of nausea/vomiting  4/30/2021 0225 by Garrett Arce RN  Outcome: Completed     Problem: Pain - Acute:  Goal: Pain level will decrease  Description: Pain level will decrease  4/30/2021 0225 by Garrett Arce RN  Outcome: Ongoing  Note: Pain controlled with Duramorph and Toradol. Discussed ice for perineal pain and/or incisional pain or the use of warm blanket/heating pad for uterine cramps. Pt states her pain goal 6/10 has been met. Problem: Urinary Retention:  Goal: Urinary elimination within specified parameters  Description: Urinary elimination within specified parameters  4/30/2021 0225 by Garrett Arce RN  Outcome: Ongoing  Note: Brooks draining clear yellow urine.       Problem: Venous Thromboembolism:  Goal: Absence of signs or symptoms of impaired coagulation  Description: Absence of signs or symptoms of impaired coagulation  4/30/2021 0225 by Dara Saini RN  Outcome: Ongoing  Note: Margie Valladares sign negative      Care plan reviewed with patient and she contributes to goal setting and voices understanding of plan of care.

## 2021-05-01 VITALS
WEIGHT: 180 LBS | HEIGHT: 62 IN | DIASTOLIC BLOOD PRESSURE: 67 MMHG | HEART RATE: 78 BPM | RESPIRATION RATE: 16 BRPM | TEMPERATURE: 98 F | BODY MASS INDEX: 33.13 KG/M2 | SYSTOLIC BLOOD PRESSURE: 109 MMHG | OXYGEN SATURATION: 100 %

## 2021-05-01 PROCEDURE — 6370000000 HC RX 637 (ALT 250 FOR IP): Performed by: OBSTETRICS & GYNECOLOGY

## 2021-05-01 RX ADMIN — FERROUS SULFATE TAB 325 MG (65 MG ELEMENTAL FE) 325 MG: 325 (65 FE) TAB at 08:15

## 2021-05-01 RX ADMIN — HYDROCODONE BITARTRATE AND ACETAMINOPHEN 2 TABLET: 5; 325 TABLET ORAL at 08:15

## 2021-05-01 RX ADMIN — CEPHALEXIN 500 MG: 500 CAPSULE ORAL at 05:55

## 2021-05-01 RX ADMIN — IBUPROFEN 800 MG: 800 TABLET, FILM COATED ORAL at 10:49

## 2021-05-01 RX ADMIN — METRONIDAZOLE 500 MG: 500 TABLET ORAL at 05:55

## 2021-05-01 RX ADMIN — DOCUSATE SODIUM 100 MG: 100 CAPSULE, LIQUID FILLED ORAL at 08:15

## 2021-05-01 RX ADMIN — IBUPROFEN 800 MG: 800 TABLET, FILM COATED ORAL at 03:02

## 2021-05-01 ASSESSMENT — PAIN SCALES - GENERAL
PAINLEVEL_OUTOF10: 6
PAINLEVEL_OUTOF10: 7

## 2021-05-01 NOTE — PLAN OF CARE
Problem: Pain:  Goal: Pain level will decrease  Description: Pain level will decrease  4/30/2021 2318 by Tyree Mccarthy RN  Outcome: Ongoing  Note: Pain controlled with po meds. Discussed ice for perineal pain and/or incisional pain or the use of warm blanket/heating pad for uterine cramps. Pt states her pain goal 4/10 has been met. Problem: Fluid Volume - Imbalance:  Goal: Absence of postpartum hemorrhage signs and symptoms  Description: Absence of postpartum hemorrhage signs and symptoms  4/30/2021 2318 by Tyree Mccarthy RN  Outcome: Ongoing  Note: Vaginal bleeding WNL, Fundus firm and midline, no clots or foul odors. Problem: Infection - Surgical Site:  Goal: Will show no infection signs and symptoms  Description: Will show no infection signs and symptoms  4/30/2021 2318 by Tyree Mccarthy RN  Outcome: Ongoing  Note: Vital signs and assessments WNL. Problem: Mood - Altered:  Goal: Mood stable  Description: Mood stable  4/30/2021 2318 by Tyree Mccarthy RN  Outcome: Ongoing  Note: Bonding with baby, participating in infant care. Problem: Pain - Acute:  Goal: Pain level will decrease  Description: Pain level will decrease  4/30/2021 2318 by Tyree Mccarthy RN  Outcome: Ongoing  Note: Pain controlled with po meds. Discussed ice for perineal pain and/or incisional pain or the use of warm blanket/heating pad for uterine cramps. Pt states her pain goal 4/10 has been met. Problem: Urinary Retention:  Goal: Urinary elimination within specified parameters  Description: Urinary elimination within specified parameters  4/30/2021 2318 by Tyree Mccarthy RN  Outcome: Ongoing  Note: WDL     Problem: Venous Thromboembolism:  Goal: Absence of signs or symptoms of impaired coagulation  Description: Absence of signs or symptoms of impaired coagulation  4/30/2021 2318 by Tyree Mccarthy RN  Outcome: Ongoing  Note: Negative homans, Lovenox , and patient is up and walking    Care plan reviewed with patient.

## 2021-05-01 NOTE — FLOWSHEET NOTE
Discharge prescriptions given to pt with instructions on use and side effects. See AVS. Pt verbalized understanding of medications. Postpartum  teaching completed and forms signed by patient. Copy witnessed by RN and given to patient. Patient verbalized understanding of all teaching points. Patient plans to follow-up with [facility] Provider as instructed. Patient verbalizes understanding of discharge instructions and denies further questions. ID bands checked. Patient discharged in stable condition accompanied by family/guardian. Discharged in wheelchair, holding baby in arms.

## 2021-05-01 NOTE — ANESTHESIA POSTPROCEDURE EVALUATION
Department of Anesthesiology  Postprocedure Note    Patient: Dominique Pimentel  MRN: [id number]  [place]: 1990  Date of evaluation: 2021  Time:  9:13 AM     Procedure Summary     Date: 21 Room / Location: [facility] OR  [address] [place] [address]    Anesthesia Start: 1394 Anesthesia Stop: 0845    Procedure: REPEAT  SECTION (N/A Uterus) Diagnosis: Velia Cifuentes FOR )    Surgeons: Charito Kaufman DO Responsible Provider: Gamaliel Vizcaino DO    Anesthesia Type: spinal ASA Status: 2          Anesthesia Type: spinal    Frde Phase I: Fred Score: 9    Fred Phase II: Fred Score: 10    Last vitals: Reviewed and per EMR flowsheets.        Anesthesia Post Evaluation    Patient location during evaluation: floor  Patient participation: complete - patient participated  Level of consciousness: awake and alert  Airway patency: patent  Nausea & Vomiting: no nausea  Complications: no  Cardiovascular status: blood pressure returned to baseline and hemodynamically stable  Respiratory status: acceptable and spontaneous ventilation  Hydration status: euvolemic

## 2023-04-29 ENCOUNTER — NURSE TRIAGE (OUTPATIENT)
Dept: OTHER | Facility: CLINIC | Age: 33
End: 2023-04-29

## 2023-04-29 NOTE — TELEPHONE ENCOUNTER
Location of patient: [place]    Subjective: Caller states \"vaginal bleeding\"     Current Symptoms: vaginal bleeding hx of ovarian cyst no nausea no dizziness states feels so warn did not check temp was in the ER in Thursday dx possible ovarian cyst and bleeding started today pain cramping is mild no other medical hx noted     Onset: this am     Associated Symptoms: NA    Pain Severity: 3/10    Temperature: possible feels warm    What has been tried:     LMP:  April 4   Pregnant: No    Recommended disposition: See in Office Within 2 Weeks    Care advice provided, patient verbalizes understanding; denies any other questions or concerns; instructed to call back for any new or worsening symptoms. Patient/caller agrees to follow-up with PCP or ob/gyn on Monday states did call her ob/gyn waiting on a call back     This triage is a result of a call to Vicky schrader Nurse. Please do not respond to the triage nurse through this encounter. Any subsequent communication should be directly with the patient.       Reason for Disposition   Menstrual cycle < 21 days OR > 35 days, and occurs more than two cycles (2 months) this past year    Protocols used: Vaginal Bleeding - Abnormal-ADULT-[place]

## 2024-03-12 NOTE — PROGRESS NOTES
Price (Do Not Change): 0.00 Pt frustrated and wanting to make sure baby is ok. VE 3-4/60/-2  FH: 130, moderate variability [place]: Q3-5min. Discussed with patient current labor process. Encouraged her to not give up and she is finally making cervical change. Discussed that there is still the possibility of a C/S in the future, but at this point it is uncertain. Patient agrees with the plan. Detail Level: Simple Instructions: This plan will send the code FBSD to the PM system.  DO NOT or CHANGE the price.

## (undated) DEVICE — SOLUTION IV 1000ML LAC RINGERS PH 6.5 INJ USP VIAFLX PLAS

## (undated) DEVICE — TRAY EPI 25GA L3.5IN 0.75% BIPIVCAIN 8.25% D CONTAIN BPA

## (undated) DEVICE — SUTURE PLN GUT SZ 3-0 L27IN ABSRB YELLOWISH TAN L36MM CT-1 842H

## (undated) DEVICE — GLOVE ORANGE PI 7 1/2   MSG9075

## (undated) DEVICE — SUTURE VCRL + SZ 0 L36IN ABSRB VLT L36MM CT-1 1/2 CIR VCP346H

## (undated) DEVICE — SUTURE VCRL SZ 0 L36IN ABSRB VLT L36MM CT-1 1/2 CIR J346H

## (undated) DEVICE — SOLUTION IV IRRIG WATER 1000ML POUR BRL 2F7114

## (undated) DEVICE — PACK PROCEDURE SURG C SECT SRMC LF

## (undated) DEVICE — SUTURE VCRL SZ 4-0 L27IN ABSRB UD L19MM FS-2 3/8 CIR REV J422H

## (undated) DEVICE — APPLICATOR PREP 26ML 0.7% IOD POVACRYLEX 74% ISO ALC ST

## (undated) DEVICE — DRESSING FOAM W4XL10IN POLYUR SAFETAC MULTILAYERED ABSRB PD

## (undated) DEVICE — LARGE, DISPOSABLE ALEXIS O C-SECTION PROTECTOR - RETRACTOR: Brand: ALEXIS ® O C-SECTION PROTECTOR - RETRACTOR

## (undated) DEVICE — GOWN,SIRUS,NON REINFRCD,LARGE,SET IN SL: Brand: MEDLINE

## (undated) DEVICE — GLOVE ORANGE PI 7   MSG9070

## (undated) DEVICE — GLOVE SURG SZ 65 THK91MIL LTX FREE SYN POLYISOPRENE

## (undated) DEVICE — SUTURE VCRL + SZ 0 L27IN ABSRB UD CT-1 L36MM 1/2 CIR TAPR VCP260H

## (undated) DEVICE — SUTURE VCRL + SZ 2-0 L27IN ABSRB CLR CT-1 1/2 CIR TAPERCUT VCP259H

## (undated) DEVICE — PAD,SANITARY,11 IN,MAXI,W/WINGS,N-STRL: Brand: MEDLINE

## (undated) DEVICE — SOLUTION IV IRRIG POUR BRL 0.9% SODIUM CHL 2F7124

## (undated) DEVICE — GLOVE SURG SZ 6 L12IN FNGR THK94MIL STD WHT ISOLEX LTX FREE

## (undated) DEVICE — SUTURE VCRL SZ 0 L27IN ABSRB UD L36MM CT-1 1/2 CIR J260H

## (undated) DEVICE — CHLORAPREP 26ML ORANGE

## (undated) DEVICE — BINDER ABD UNISX 4 PNL PREM 6INX6INX12IN L XL 4

## (undated) DEVICE — SUTURE VCRL + SZ 4-0 L27IN ABSRB WHT FS-2 3/8 CIR REV CUT VCP422H

## (undated) DEVICE — SUTURE ABSORBABLE BRAIDED 2-0 CT-1 27 IN UD VICRYL J259H

## (undated) DEVICE — ELECTROSURGICAL PENCIL BUTTON SWITCH E-Z CLEAN COATED BLADE ELECTRODE 10 FT (3 M) CORD HOLSTER: Brand: MEGADYNE